# Patient Record
Sex: FEMALE | Race: WHITE | NOT HISPANIC OR LATINO
[De-identification: names, ages, dates, MRNs, and addresses within clinical notes are randomized per-mention and may not be internally consistent; named-entity substitution may affect disease eponyms.]

---

## 2017-05-24 ENCOUNTER — APPOINTMENT (OUTPATIENT)
Dept: OBGYN | Facility: CLINIC | Age: 82
End: 2017-05-24

## 2017-05-24 ENCOUNTER — RESULT CHARGE (OUTPATIENT)
Age: 82
End: 2017-05-24

## 2017-05-24 VITALS
HEIGHT: 62 IN | HEART RATE: 80 BPM | DIASTOLIC BLOOD PRESSURE: 72 MMHG | BODY MASS INDEX: 34.78 KG/M2 | SYSTOLIC BLOOD PRESSURE: 124 MMHG | WEIGHT: 189 LBS

## 2017-05-24 DIAGNOSIS — N95.0 POSTMENOPAUSAL BLEEDING: ICD-10-CM

## 2017-05-24 LAB
BILIRUB UR QL STRIP: NEGATIVE
GLUCOSE UR-MCNC: NEGATIVE
HCG UR QL: 0.2 EU/DL
HGB UR QL STRIP.AUTO: NORMAL
KETONES UR-MCNC: NEGATIVE
LEUKOCYTE ESTERASE UR QL STRIP: NORMAL
NITRITE UR QL STRIP: NEGATIVE
PH UR STRIP: 5.5
PROT UR STRIP-MCNC: NORMAL
SP GR UR STRIP: 1.02

## 2017-05-30 ENCOUNTER — MEDICATION RENEWAL (OUTPATIENT)
Age: 82
End: 2017-05-30

## 2017-05-30 LAB — BACTERIA UR CULT: ABNORMAL

## 2018-01-20 ENCOUNTER — INPATIENT (INPATIENT)
Facility: HOSPITAL | Age: 83
LOS: 4 days | Discharge: HOME | DRG: 178 | End: 2018-01-25
Attending: HOSPITALIST | Admitting: HOSPITALIST
Payer: MEDICARE

## 2018-01-20 VITALS
SYSTOLIC BLOOD PRESSURE: 134 MMHG | RESPIRATION RATE: 35 BRPM | DIASTOLIC BLOOD PRESSURE: 61 MMHG | HEIGHT: 65 IN | TEMPERATURE: 94 F | WEIGHT: 220.02 LBS | HEART RATE: 86 BPM

## 2018-01-20 DIAGNOSIS — R06.03 ACUTE RESPIRATORY DISTRESS: ICD-10-CM

## 2018-01-20 LAB
ALBUMIN SERPL ELPH-MCNC: 2.2 G/DL — LOW (ref 3.3–5.2)
ALP SERPL-CCNC: 126 U/L — HIGH (ref 40–120)
ALT FLD-CCNC: 8 U/L — SIGNIFICANT CHANGE UP
ANION GAP SERPL CALC-SCNC: 10 MMOL/L — SIGNIFICANT CHANGE UP (ref 5–17)
APPEARANCE UR: CLEAR — SIGNIFICANT CHANGE UP
APTT BLD: 30 SEC — SIGNIFICANT CHANGE UP (ref 27.5–37.4)
AST SERPL-CCNC: 36 U/L — HIGH
BACTERIA # UR AUTO: ABNORMAL
BASOPHILS # BLD AUTO: 0 K/UL — SIGNIFICANT CHANGE UP (ref 0–0.2)
BASOPHILS NFR BLD AUTO: 0.1 % — SIGNIFICANT CHANGE UP (ref 0–2)
BILIRUB SERPL-MCNC: 0.2 MG/DL — LOW (ref 0.4–2)
BILIRUB UR-MCNC: NEGATIVE — SIGNIFICANT CHANGE UP
BUN SERPL-MCNC: 24 MG/DL — HIGH (ref 8–20)
CALCIUM SERPL-MCNC: 9.4 MG/DL — SIGNIFICANT CHANGE UP (ref 8.6–10.2)
CHLORIDE SERPL-SCNC: 99 MMOL/L — SIGNIFICANT CHANGE UP (ref 98–107)
CO2 SERPL-SCNC: 26 MMOL/L — SIGNIFICANT CHANGE UP (ref 22–29)
COLOR SPEC: YELLOW — SIGNIFICANT CHANGE UP
CREAT SERPL-MCNC: 0.95 MG/DL — SIGNIFICANT CHANGE UP (ref 0.5–1.3)
DIFF PNL FLD: ABNORMAL
EOSINOPHIL # BLD AUTO: 0.1 K/UL — SIGNIFICANT CHANGE UP (ref 0–0.5)
EOSINOPHIL NFR BLD AUTO: 0.3 % — SIGNIFICANT CHANGE UP (ref 0–6)
EPI CELLS # UR: SIGNIFICANT CHANGE UP
GLUCOSE BLDC GLUCOMTR-MCNC: 136 MG/DL — HIGH (ref 70–99)
GLUCOSE BLDC GLUCOMTR-MCNC: 48 MG/DL — LOW (ref 70–99)
GLUCOSE BLDC GLUCOMTR-MCNC: 53 MG/DL — LOW (ref 70–99)
GLUCOSE SERPL-MCNC: 102 MG/DL — SIGNIFICANT CHANGE UP (ref 70–115)
GLUCOSE UR QL: NEGATIVE MG/DL — SIGNIFICANT CHANGE UP
HCT VFR BLD CALC: 30.3 % — LOW (ref 37–47)
HGB BLD-MCNC: 10 G/DL — LOW (ref 12–16)
INR BLD: 1.1 RATIO — SIGNIFICANT CHANGE UP (ref 0.88–1.16)
KETONES UR-MCNC: NEGATIVE — SIGNIFICANT CHANGE UP
LACTATE BLDV-MCNC: 0.8 MMOL/L — SIGNIFICANT CHANGE UP (ref 0.5–2)
LEUKOCYTE ESTERASE UR-ACNC: ABNORMAL
LIDOCAIN IGE QN: 14 U/L — LOW (ref 22–51)
LYMPHOCYTES # BLD AUTO: 1.1 K/UL — SIGNIFICANT CHANGE UP (ref 1–4.8)
LYMPHOCYTES # BLD AUTO: 5.3 % — LOW (ref 20–55)
MAGNESIUM SERPL-MCNC: 1.7 MG/DL — SIGNIFICANT CHANGE UP (ref 1.6–2.6)
MCHC RBC-ENTMCNC: 31.1 PG — HIGH (ref 27–31)
MCHC RBC-ENTMCNC: 33 G/DL — SIGNIFICANT CHANGE UP (ref 32–36)
MCV RBC AUTO: 94.1 FL — SIGNIFICANT CHANGE UP (ref 81–99)
MONOCYTES # BLD AUTO: 1.4 K/UL — HIGH (ref 0–0.8)
MONOCYTES NFR BLD AUTO: 6.6 % — SIGNIFICANT CHANGE UP (ref 3–10)
NEUTROPHILS # BLD AUTO: 18.1 K/UL — HIGH (ref 1.8–8)
NEUTROPHILS NFR BLD AUTO: 87.4 % — HIGH (ref 37–73)
NITRITE UR-MCNC: NEGATIVE — SIGNIFICANT CHANGE UP
NT-PROBNP SERPL-SCNC: 887 PG/ML — HIGH (ref 0–300)
PH UR: 6 — SIGNIFICANT CHANGE UP (ref 5–8)
PHOSPHATE SERPL-MCNC: 3.4 MG/DL — SIGNIFICANT CHANGE UP (ref 2.4–4.7)
PLATELET # BLD AUTO: 560 K/UL — HIGH (ref 150–400)
POTASSIUM SERPL-MCNC: 5.6 MMOL/L — HIGH (ref 3.5–5.3)
POTASSIUM SERPL-SCNC: 5.6 MMOL/L — HIGH (ref 3.5–5.3)
PROT SERPL-MCNC: 6 G/DL — LOW (ref 6.6–8.7)
PROT UR-MCNC: NEGATIVE MG/DL — SIGNIFICANT CHANGE UP
PROTHROM AB SERPL-ACNC: 12.1 SEC — SIGNIFICANT CHANGE UP (ref 9.8–12.7)
RBC # BLD: 3.22 M/UL — LOW (ref 4.4–5.2)
RBC # FLD: 13.7 % — SIGNIFICANT CHANGE UP (ref 11–15.6)
RBC CASTS # UR COMP ASSIST: ABNORMAL /HPF (ref 0–4)
SODIUM SERPL-SCNC: 135 MMOL/L — SIGNIFICANT CHANGE UP (ref 135–145)
SP GR SPEC: 1.01 — SIGNIFICANT CHANGE UP (ref 1.01–1.02)
TROPONIN T SERPL-MCNC: <0.01 NG/ML — SIGNIFICANT CHANGE UP (ref 0–0.06)
TSH SERPL-MCNC: 1.64 UIU/ML — SIGNIFICANT CHANGE UP (ref 0.27–4.2)
UROBILINOGEN FLD QL: NEGATIVE MG/DL — SIGNIFICANT CHANGE UP
WBC # BLD: 20.7 K/UL — HIGH (ref 4.8–10.8)
WBC # FLD AUTO: 20.7 K/UL — HIGH (ref 4.8–10.8)
WBC UR QL: SIGNIFICANT CHANGE UP

## 2018-01-20 PROCEDURE — 93010 ELECTROCARDIOGRAM REPORT: CPT

## 2018-01-20 PROCEDURE — 99223 1ST HOSP IP/OBS HIGH 75: CPT

## 2018-01-20 PROCEDURE — 71045 X-RAY EXAM CHEST 1 VIEW: CPT | Mod: 26

## 2018-01-20 PROCEDURE — 99285 EMERGENCY DEPT VISIT HI MDM: CPT | Mod: 25

## 2018-01-20 RX ORDER — DEXTROSE 50 % IN WATER 50 %
25 SYRINGE (ML) INTRAVENOUS ONCE
Qty: 0 | Refills: 0 | Status: DISCONTINUED | OUTPATIENT
Start: 2018-01-20 | End: 2018-01-25

## 2018-01-20 RX ORDER — DEXTROSE 50 % IN WATER 50 %
12.5 SYRINGE (ML) INTRAVENOUS ONCE
Qty: 0 | Refills: 0 | Status: DISCONTINUED | OUTPATIENT
Start: 2018-01-20 | End: 2018-01-25

## 2018-01-20 RX ORDER — INSULIN LISPRO 100/ML
VIAL (ML) SUBCUTANEOUS
Qty: 0 | Refills: 0 | Status: DISCONTINUED | OUTPATIENT
Start: 2018-01-20 | End: 2018-01-25

## 2018-01-20 RX ORDER — DOCUSATE SODIUM 100 MG
100 CAPSULE ORAL DAILY
Qty: 0 | Refills: 0 | Status: DISCONTINUED | OUTPATIENT
Start: 2018-01-20 | End: 2018-01-25

## 2018-01-20 RX ORDER — GLUCAGON INJECTION, SOLUTION 0.5 MG/.1ML
1 INJECTION, SOLUTION SUBCUTANEOUS ONCE
Qty: 0 | Refills: 0 | Status: DISCONTINUED | OUTPATIENT
Start: 2018-01-20 | End: 2018-01-25

## 2018-01-20 RX ORDER — SODIUM CHLORIDE 9 MG/ML
1000 INJECTION, SOLUTION INTRAVENOUS
Qty: 0 | Refills: 0 | Status: DISCONTINUED | OUTPATIENT
Start: 2018-01-20 | End: 2018-01-22

## 2018-01-20 RX ORDER — OXYCODONE AND ACETAMINOPHEN 5; 325 MG/1; MG/1
1 TABLET ORAL EVERY 8 HOURS
Qty: 0 | Refills: 0 | Status: DISCONTINUED | OUTPATIENT
Start: 2018-01-20 | End: 2018-01-25

## 2018-01-20 RX ORDER — BRIMONIDINE TARTRATE 2 MG/MG
1 SOLUTION/ DROPS OPHTHALMIC
Qty: 0 | Refills: 0 | Status: DISCONTINUED | OUTPATIENT
Start: 2018-01-20 | End: 2018-01-25

## 2018-01-20 RX ORDER — ALPRAZOLAM 0.25 MG
0.25 TABLET ORAL AT BEDTIME
Qty: 0 | Refills: 0 | Status: DISCONTINUED | OUTPATIENT
Start: 2018-01-20 | End: 2018-01-25

## 2018-01-20 RX ORDER — TIMOLOL 0.5 %
1 DROPS OPHTHALMIC (EYE)
Qty: 0 | Refills: 0 | Status: DISCONTINUED | OUTPATIENT
Start: 2018-01-20 | End: 2018-01-25

## 2018-01-20 RX ORDER — VANCOMYCIN HCL 1 G
1000 VIAL (EA) INTRAVENOUS ONCE
Qty: 0 | Refills: 0 | Status: COMPLETED | OUTPATIENT
Start: 2018-01-20 | End: 2018-01-20

## 2018-01-20 RX ORDER — METRONIDAZOLE 7.5 MG/G
1 GEL VAGINAL
Qty: 0 | Refills: 0 | Status: DISCONTINUED | OUTPATIENT
Start: 2018-01-20 | End: 2018-01-25

## 2018-01-20 RX ORDER — SIMVASTATIN 20 MG/1
20 TABLET, FILM COATED ORAL AT BEDTIME
Qty: 0 | Refills: 0 | Status: DISCONTINUED | OUTPATIENT
Start: 2018-01-20 | End: 2018-01-25

## 2018-01-20 RX ORDER — SACCHAROMYCES BOULARDII 250 MG
250 POWDER IN PACKET (EA) ORAL
Qty: 0 | Refills: 0 | Status: DISCONTINUED | OUTPATIENT
Start: 2018-01-20 | End: 2018-01-25

## 2018-01-20 RX ORDER — MUPIROCIN 20 MG/G
1 OINTMENT TOPICAL
Qty: 0 | Refills: 0 | Status: DISCONTINUED | OUTPATIENT
Start: 2018-01-20 | End: 2018-01-25

## 2018-01-20 RX ORDER — SODIUM CHLORIDE 9 MG/ML
1000 INJECTION, SOLUTION INTRAVENOUS
Qty: 0 | Refills: 0 | Status: DISCONTINUED | OUTPATIENT
Start: 2018-01-20 | End: 2018-01-25

## 2018-01-20 RX ORDER — PIPERACILLIN AND TAZOBACTAM 4; .5 G/20ML; G/20ML
3.38 INJECTION, POWDER, LYOPHILIZED, FOR SOLUTION INTRAVENOUS EVERY 8 HOURS
Qty: 0 | Refills: 0 | Status: DISCONTINUED | OUTPATIENT
Start: 2018-01-20 | End: 2018-01-21

## 2018-01-20 RX ORDER — DEXTROSE 50 % IN WATER 50 %
1 SYRINGE (ML) INTRAVENOUS ONCE
Qty: 0 | Refills: 0 | Status: DISCONTINUED | OUTPATIENT
Start: 2018-01-20 | End: 2018-01-25

## 2018-01-20 RX ORDER — PIPERACILLIN AND TAZOBACTAM 4; .5 G/20ML; G/20ML
3.38 INJECTION, POWDER, LYOPHILIZED, FOR SOLUTION INTRAVENOUS ONCE
Qty: 0 | Refills: 0 | Status: COMPLETED | OUTPATIENT
Start: 2018-01-20 | End: 2018-01-20

## 2018-01-20 RX ADMIN — SODIUM CHLORIDE 50 MILLILITER(S): 9 INJECTION, SOLUTION INTRAVENOUS at 14:39

## 2018-01-20 RX ADMIN — OXYCODONE AND ACETAMINOPHEN 1 TABLET(S): 5; 325 TABLET ORAL at 14:50

## 2018-01-20 RX ADMIN — OXYCODONE AND ACETAMINOPHEN 1 TABLET(S): 5; 325 TABLET ORAL at 21:36

## 2018-01-20 RX ADMIN — OXYCODONE AND ACETAMINOPHEN 1 TABLET(S): 5; 325 TABLET ORAL at 22:30

## 2018-01-20 RX ADMIN — PIPERACILLIN AND TAZOBACTAM 200 GRAM(S): 4; .5 INJECTION, POWDER, LYOPHILIZED, FOR SOLUTION INTRAVENOUS at 08:43

## 2018-01-20 RX ADMIN — SIMVASTATIN 20 MILLIGRAM(S): 20 TABLET, FILM COATED ORAL at 21:36

## 2018-01-20 RX ADMIN — Medication 250 MILLIGRAM(S): at 09:35

## 2018-01-20 RX ADMIN — PIPERACILLIN AND TAZOBACTAM 25 GRAM(S): 4; .5 INJECTION, POWDER, LYOPHILIZED, FOR SOLUTION INTRAVENOUS at 19:29

## 2018-01-20 RX ADMIN — OXYCODONE AND ACETAMINOPHEN 1 TABLET(S): 5; 325 TABLET ORAL at 15:02

## 2018-01-20 RX ADMIN — Medication 0.25 MILLIGRAM(S): at 21:36

## 2018-01-20 NOTE — ED ADULT NURSE NOTE - OBJECTIVE STATEMENT
84 yof presents to ed complaining of difficulty breathing started this AM, onset of symptoms at 6 am. increase work of breathing reported by NX home. at this time patient afebrile, hypothermic, warming blanket applied. code sepsis protocol followed. airway intact lung sounds clear equal bilaterally ab round denies pain/ discomfort. denies numbness denies tingling denies cp, moves all extremities with assistance. stage II pressure ulcer right buttock. Malodorous urine straight cath'd

## 2018-01-20 NOTE — H&P ADULT - ASSESSMENT
Pt with multiple problems sent from NH for respiratory distress- has been admitted to medicine for possible PNA.    C/w Abx.  Pt DNI /DNR- MOLST form from NH in charts.    Full note to follow. Pt is very poor historian and unable to provide meaningful history. History obtained from ED staff, EMR and NH charts. Briefly she is 83 y/o female with PMHx of HTN, HLD, DM, Neuropathy, Arthritis,  Anxiety disorder, pressure ulcers, glaucoma, was sent to ED from NH for respiratory distress. Per history pt was noted to have tachypnea, laboured breathing at NH and noted to have hypoglycemia 51 by EMS and glucagon was given en-route to ED. Upon arrival to ED she was very tachypnea and patient was placed on CPAP. During my evaluation patient on NC oxygen, NAD, reports she is fine and at baseline, she denied any breathing problems but at the same time is AOX2- poor historian. IN Ed noted to have leukocytosis, hypoglycemia, hypothermia kept on warming blanket, high Pro-BNP, UA /CXR unremarkable. Admitted to medicine for respiratory distress and hypoglycemia.     Plan:    Acute hypoxic Respiratory distress:  - Unknown etiology, CXR unremarkable, Pro-BNP elevated with LE edema, ? Cardiac origin.  - Supplement oxygen vi NC, BIPAP as needed  - Obtain TTE, CT chest   - S/p Vanco /zosyn in ED. Will c/w Zosyn for now pending cx.    Hypoglycemia / Hypothermia:  - hypoglycemia can be related to poor PO intake and also pt on antidiabetic meds. Hypothermia can be from hypoglycemia.   - Keep on D5 for now  - Check TSH    Chronic condition HTN /HLD /DM / neuropathy / arhtirits / Glaucoma /anxiety / Pressure ulcer:  - Resume home meds except oral hypoglycemic / antihypertensive drugs for now.  - Keep on LSS with Fs monitoring for DM. Currently hypoglycemic- will keep on D5 for now.  - Nursing care for sacral ulcers.    Code status:  - DNR /DNI- MOLST form from NH in chart.

## 2018-01-20 NOTE — ED ADULT TRIAGE NOTE - CHIEF COMPLAINT QUOTE
Pt comes from [facility] with respiratory distress "foaming at mouth, with rales all the way up" as per EMS, pt tachypneic with labored breathing, RT and MD Berry called to bedside, pt placed on cpap prior to arrival

## 2018-01-20 NOTE — ED PROVIDER NOTE - CARE PLAN
Principal Discharge DX:	Respiratory distress Principal Discharge DX:	Respiratory distress  Secondary Diagnosis:	Pneumonia

## 2018-01-20 NOTE — ED ADULT NURSE NOTE - CHIEF COMPLAINT QUOTE
Pt comes from [facility] with respiratory distress "foaming at mouth, with rales all the way up" as per EMS, pt tachypneic with labored breathing, RT and MD Berry called to bedside, pt placed on cpap prior to arrival

## 2018-01-20 NOTE — H&P ADULT - HISTORY OF PRESENT ILLNESS
Pt is very poor historian and unable to provide meaningful history. History obtained from ED staff, EMR and NH charts. Briefly she is 83 y/o female with PMHx of HTN, HLD, DM, Neuropathy, Arthritis,  Anxiety disorder, pressure ulcers, glaucoma, was sent to ED from NH for respiratory distress. Per history pt was noted to have tachypnea, laboured breathing at NH and noted to have hypoglycemia 51 by EMS and glucagon was given en-route to ED. Upon arrival to ED she was very tachypnea and patient was placed on CPAP. During my evaluation patient on NC oxygen, NAD, reports she is fine and at baseline, she denied any breathing problems but at the same time is AOX2- poor historian.

## 2018-01-20 NOTE — H&P ADULT - NSHPLABSRESULTS_GEN_ALL_CORE
LABS:                        10.0   20.7  )-----------( 560      ( 2018 07:38 )             30.3         135  |  99  |  24.0<H>  ----------------------------<  102  5.6<H>   |  26.0  |  0.95    Ca    9.4      2018 07:38  Phos  3.4       Mg     1.7         TPro  6.0<L>  /  Alb  2.2<L>  /  TBili  0.2<L>  /  DBili  x   /  AST  36<H>  /  ALT  8   /  AlkPhos  126<H>      PT/INR - ( 2018 07:38 )   PT: 12.1 sec;   INR: 1.10 ratio         PTT - ( 2018 07:38 )  PTT:30.0 sec    LIVER FUNCTIONS - ( 2018 07:38 )  Alb: 2.2 g/dL / Pro: 6.0 g/dL / ALK PHOS: 126 U/L / ALT: 8 U/L / AST: 36 U/L / GGT: x           Urinalysis Basic - ( 2018 08:40 )    Color: Yellow / Appearance: Clear / S.010 / pH: x  Gluc: x / Ketone: Negative  / Bili: Negative / Urobili: Negative mg/dL   Blood: x / Protein: Negative mg/dL / Nitrite: Negative   Leuk Esterase: Trace / RBC: x / WBC x   Sq Epi: x / Non Sq Epi: x / Bacteria: x        CARDIAC MARKERS ( 2018 07:38 )  x     / <0.01 ng/mL / x     / x     / x

## 2018-01-20 NOTE — ED PROVIDER NOTE - PROGRESS NOTE DETAILS
labs pending  signed to am ed attending pt with pneumonia to be admitted to [facility]

## 2018-01-20 NOTE — H&P ADULT - NSHPPHYSICALEXAM_GEN_ALL_CORE
PHYSICAL EXAM:    Vital Signs Last 24 Hrs  T(C): 34.7 (20 Jan 2018 06:45), Max: 34.7 (20 Jan 2018 06:45)  T(F): 94.5 (20 Jan 2018 06:45), Max: 94.5 (20 Jan 2018 06:45)  HR: 86 (20 Jan 2018 06:45) (86 - 86)  BP: 134/61 (20 Jan 2018 06:45) (134/61 - 134/61)  BP(mean): --  RR: 35 (20 Jan 2018 06:45) (35 - 35)  SpO2: --    GENERAL: Pt lying comfortably, NAD.  ENMT: PERRL, +EOMI.  NECK: soft, Supple, No JVD,   CHEST/LUNG: Clear to auscultation bilaterally; No wheezing.  HEART: S1S2+, Regular rate and rhythm; No murmurs.  ABDOMEN: Soft, Nontender, Obese, Bowel sounds present.  MUSCULOSKELETAL: Normal range of motion.  SKIN: Sacral ulcer ? stage / right buttock redness  NEURO: AAOX2, moves all 4 extremities.   PSYCH: normal mood.

## 2018-01-20 NOTE — H&P ADULT - PMH
Arthritis    Chronic back pain, unspecified back location, unspecified back pain laterality    Diabetes 1.5, managed as type 2    Essential hypertension    Glaucoma, unspecified glaucoma type, unspecified laterality    Pure hypercholesterolemia    Skin ulcer, unspecified ulcer stage

## 2018-01-20 NOTE — ED PROVIDER NOTE - PHYSICAL EXAMINATION
Constitutional : In moderate respiratory distress. Speaking in 1-2 word sentences, incomprehensible.   Head :NC AT , no swelling  Eyes :eomi, no swelling  Mouth :Dry mm.   Neck : supple, trachea in midline  Chest :Bilateral poor inspiratory effort.  Heart :S1 S2 distant  Abdomen: ABDOMEN IS OBESE, DISTENDED.   Musc/Skel :BILATERAL LOWER EXTREMITY 2+ PITTING EDEMA. GENERALIZED WEAKNESS, UNABLE TO ASSESS RANGE OF MOTION.  Neuro  :Difficult to assess, pt is too sick.  Skin: pale, clammy.

## 2018-01-20 NOTE — ED PROVIDER NOTE - OBJECTIVE STATEMENT
85 y/o F pt with a pmhx of DM, peripheral neuropathy, hypercholesterolemia, HTN, glaucoma and arthropathy BIBA c/o difficulty breathing. [facility]. The nursing home activated EMS for respiratory distress. EMS notes that the blood gluces was 51 enroute. EMS administered glucagon en route. Unable to obtain a full Hx, pt is too sick.

## 2018-01-21 LAB
-  COAGULASE NEGATIVE STAPHYLOCOCCUS: SIGNIFICANT CHANGE UP
ALBUMIN SERPL ELPH-MCNC: 1.8 G/DL — LOW (ref 3.3–5.2)
ALP SERPL-CCNC: 116 U/L — SIGNIFICANT CHANGE UP (ref 40–120)
ALT FLD-CCNC: 7 U/L — SIGNIFICANT CHANGE UP
ANION GAP SERPL CALC-SCNC: 11 MMOL/L — SIGNIFICANT CHANGE UP (ref 5–17)
AST SERPL-CCNC: 28 U/L — SIGNIFICANT CHANGE UP
BILIRUB SERPL-MCNC: 0.2 MG/DL — LOW (ref 0.4–2)
BUN SERPL-MCNC: 21 MG/DL — HIGH (ref 8–20)
CALCIUM SERPL-MCNC: 9.3 MG/DL — SIGNIFICANT CHANGE UP (ref 8.6–10.2)
CHLORIDE SERPL-SCNC: 95 MMOL/L — LOW (ref 98–107)
CO2 SERPL-SCNC: 24 MMOL/L — SIGNIFICANT CHANGE UP (ref 22–29)
CREAT SERPL-MCNC: 0.93 MG/DL — SIGNIFICANT CHANGE UP (ref 0.5–1.3)
GLUCOSE BLDC GLUCOMTR-MCNC: 197 MG/DL — HIGH (ref 70–99)
GLUCOSE BLDC GLUCOMTR-MCNC: 214 MG/DL — HIGH (ref 70–99)
GLUCOSE BLDC GLUCOMTR-MCNC: 54 MG/DL — LOW (ref 70–99)
GLUCOSE BLDC GLUCOMTR-MCNC: 67 MG/DL — LOW (ref 70–99)
GLUCOSE BLDC GLUCOMTR-MCNC: 67 MG/DL — LOW (ref 70–99)
GLUCOSE BLDC GLUCOMTR-MCNC: 83 MG/DL — SIGNIFICANT CHANGE UP (ref 70–99)
GLUCOSE SERPL-MCNC: 61 MG/DL — LOW (ref 70–115)
HBA1C BLD-MCNC: 7.2 % — HIGH (ref 4–5.6)
HCT VFR BLD CALC: 28.3 % — LOW (ref 37–47)
HGB BLD-MCNC: 9.2 G/DL — LOW (ref 12–16)
MCHC RBC-ENTMCNC: 30.2 PG — SIGNIFICANT CHANGE UP (ref 27–31)
MCHC RBC-ENTMCNC: 32.5 G/DL — SIGNIFICANT CHANGE UP (ref 32–36)
MCV RBC AUTO: 92.8 FL — SIGNIFICANT CHANGE UP (ref 81–99)
METHOD TYPE: SIGNIFICANT CHANGE UP
ORGANISM # SPEC MICROSCOPIC CNT: SIGNIFICANT CHANGE UP
PLATELET # BLD AUTO: 494 K/UL — HIGH (ref 150–400)
POTASSIUM SERPL-MCNC: 5.2 MMOL/L — SIGNIFICANT CHANGE UP (ref 3.5–5.3)
POTASSIUM SERPL-SCNC: 5.2 MMOL/L — SIGNIFICANT CHANGE UP (ref 3.5–5.3)
PROT SERPL-MCNC: 5.3 G/DL — LOW (ref 6.6–8.7)
RAPID RVP RESULT: SIGNIFICANT CHANGE UP
RBC # BLD: 3.05 M/UL — LOW (ref 4.4–5.2)
RBC # FLD: 13.9 % — SIGNIFICANT CHANGE UP (ref 11–15.6)
SODIUM SERPL-SCNC: 130 MMOL/L — LOW (ref 135–145)
SPECIMEN SOURCE: SIGNIFICANT CHANGE UP
WBC # BLD: 8.5 K/UL — SIGNIFICANT CHANGE UP (ref 4.8–10.8)
WBC # FLD AUTO: 8.5 K/UL — SIGNIFICANT CHANGE UP (ref 4.8–10.8)

## 2018-01-21 PROCEDURE — 99233 SBSQ HOSP IP/OBS HIGH 50: CPT

## 2018-01-21 PROCEDURE — 71275 CT ANGIOGRAPHY CHEST: CPT | Mod: 26

## 2018-01-21 RX ORDER — CEFEPIME 1 G/1
2000 INJECTION, POWDER, FOR SOLUTION INTRAMUSCULAR; INTRAVENOUS
Qty: 0 | Refills: 0 | Status: DISCONTINUED | OUTPATIENT
Start: 2018-01-21 | End: 2018-01-25

## 2018-01-21 RX ORDER — DEXTROSE 50 % IN WATER 50 %
1 SYRINGE (ML) INTRAVENOUS ONCE
Qty: 0 | Refills: 0 | Status: COMPLETED | OUTPATIENT
Start: 2018-01-21 | End: 2018-01-21

## 2018-01-21 RX ADMIN — OXYCODONE AND ACETAMINOPHEN 1 TABLET(S): 5; 325 TABLET ORAL at 22:15

## 2018-01-21 RX ADMIN — OXYCODONE AND ACETAMINOPHEN 1 TABLET(S): 5; 325 TABLET ORAL at 14:35

## 2018-01-21 RX ADMIN — OXYCODONE AND ACETAMINOPHEN 1 TABLET(S): 5; 325 TABLET ORAL at 07:54

## 2018-01-21 RX ADMIN — Medication 1 DROP(S): at 06:45

## 2018-01-21 RX ADMIN — OXYCODONE AND ACETAMINOPHEN 1 TABLET(S): 5; 325 TABLET ORAL at 08:45

## 2018-01-21 RX ADMIN — Medication 250 MILLIGRAM(S): at 06:44

## 2018-01-21 RX ADMIN — CEFEPIME 100 MILLIGRAM(S): 1 INJECTION, POWDER, FOR SOLUTION INTRAMUSCULAR; INTRAVENOUS at 18:06

## 2018-01-21 RX ADMIN — Medication 1 TABLET(S): at 12:18

## 2018-01-21 RX ADMIN — OXYCODONE AND ACETAMINOPHEN 1 TABLET(S): 5; 325 TABLET ORAL at 21:59

## 2018-01-21 RX ADMIN — MUPIROCIN 1 APPLICATION(S): 20 OINTMENT TOPICAL at 06:45

## 2018-01-21 RX ADMIN — BRIMONIDINE TARTRATE 1 DROP(S): 2 SOLUTION/ DROPS OPHTHALMIC at 06:45

## 2018-01-21 RX ADMIN — Medication 250 MILLIGRAM(S): at 18:07

## 2018-01-21 RX ADMIN — Medication 2: at 12:18

## 2018-01-21 RX ADMIN — MUPIROCIN 1 APPLICATION(S): 20 OINTMENT TOPICAL at 18:07

## 2018-01-21 RX ADMIN — Medication 100 MILLIGRAM(S): at 12:18

## 2018-01-21 RX ADMIN — BRIMONIDINE TARTRATE 1 DROP(S): 2 SOLUTION/ DROPS OPHTHALMIC at 18:06

## 2018-01-21 RX ADMIN — Medication 1 DOSE(S): at 08:05

## 2018-01-21 RX ADMIN — Medication 2: at 17:04

## 2018-01-21 RX ADMIN — Medication 0.25 MILLIGRAM(S): at 21:59

## 2018-01-21 RX ADMIN — METRONIDAZOLE 1 APPLICATION(S): 7.5 GEL VAGINAL at 06:44

## 2018-01-21 RX ADMIN — PIPERACILLIN AND TAZOBACTAM 25 GRAM(S): 4; .5 INJECTION, POWDER, LYOPHILIZED, FOR SOLUTION INTRAVENOUS at 06:45

## 2018-01-21 RX ADMIN — OXYCODONE AND ACETAMINOPHEN 1 TABLET(S): 5; 325 TABLET ORAL at 15:30

## 2018-01-21 RX ADMIN — METRONIDAZOLE 1 APPLICATION(S): 7.5 GEL VAGINAL at 18:06

## 2018-01-21 RX ADMIN — SIMVASTATIN 20 MILLIGRAM(S): 20 TABLET, FILM COATED ORAL at 21:59

## 2018-01-21 RX ADMIN — Medication 1 DROP(S): at 18:08

## 2018-01-21 NOTE — PATIENT PROFILE ADULT. - VISION (WITH CORRECTIVE LENSES IF THE PATIENT USUALLY WEARS THEM):
Normal vision: sees adequately in most situations; can see medication labels, newsprint/does wear glasses

## 2018-01-21 NOTE — PROGRESS NOTE ADULT - SUBJECTIVE AND OBJECTIVE BOX
DAVION QUACH Female 84y [id number]    Patient is a 84y old  Female who presents with a chief complaint of Sent from NH for respiratory distress. (2018 10:22)      Subjective/objective:  Pt seen and examined at bedside, no over night event reported by night staff. Pt has ? mild underlying dementia- she is awake, knows her , her sons name and knows she is in hospital. Does not has idea why she was brought into hospital. She denied any complaints.     Review of system:  No fever, chills, nausea, vomiting, headache, dizziness, chest pain, SOB or palpitation.      PHYSICAL EXAM:    Vital Signs Last 24 Hrs  T(C): 36.6 (2018 10:36), Max: 36.9 (2018 20:00)  T(F): 97.9 (2018 10:36), Max: 98.4 (2018 20:00)  HR: 84 (2018 10:36) (70 - 89)  BP: 134/66 (2018 10:36) (117/58 - 177/69)  BP(mean): --  RR: 20 (2018 10:36) (20 - 22)  SpO2: 100% (2018 07:37) (92% - 100%)    GENERAL: Pt lying comfortably, NAD.  CHEST/LUNG: Clear to auscultation bilaterally; No wheezing.  HEART: S1S2+, Regular rate and rhythm; No murmurs.  ABDOMEN: Soft, Nontender, Nondistended; Bowel sounds present.  NEURO: AAOX2, no focal deficits, no motor r sensory loss.  Skin: Sacral ulcer ? stage / right buttock redness          MEDICATIONS  (STANDING):  ALPRAZolam 0.25 milliGRAM(s) Oral at bedtime  brimonidine 0.2% Ophthalmic Solution 1 Drop(s) Both EYES two times a day  cefepime  IVPB 2000 milliGRAM(s) IV Intermittent <User Schedule>  dextrose 5%. 1000 milliLiter(s) (50 mL/Hr) IV Continuous <Continuous>  dextrose 5%. 1000 milliLiter(s) (50 mL/Hr) IV Continuous <Continuous>  dextrose 50% Injectable 12.5 Gram(s) IV Push once  dextrose 50% Injectable 25 Gram(s) IV Push once  dextrose 50% Injectable 25 Gram(s) IV Push once  docusate sodium 100 milliGRAM(s) Oral daily  insulin lispro (HumaLOG) corrective regimen sliding scale   SubCutaneous three times a day before meals  metroNIDAZOLE 0.75% Gel 1 Application(s) Topical two times a day  multivitamin 1 Tablet(s) Oral daily  mupirocin 2% Ointment 1 Application(s) Topical two times a day  oxyCODONE    5 mG/acetaminophen 325 mG 1 Tablet(s) Oral every 8 hours  saccharomyces boulardii 250 milliGRAM(s) Oral two times a day  simvastatin 20 milliGRAM(s) Oral at bedtime  timolol 0.5% Solution 1 Drop(s) Both EYES two times a day    MEDICATIONS  (PRN):  dextrose Gel 1 Dose(s) Oral once PRN Blood Glucose LESS THAN 70 milliGRAM(s)/deciliter  glucagon  Injectable 1 milliGRAM(s) IntraMuscular once PRN Glucose LESS THAN 70 milligrams/deciliter        Labs:  LABS:                        9.2    8.5   )-----------( 494      ( 2018 06:29 )             28.3     01-    130<L>  |  95<L>  |  21.0<H>  ----------------------------<  61<L>  5.2   |  24.0  |  0.93    Ca    9.3      2018 06:29  Phos  3.4     01-20  Mg     1.7     -    TPro  5.3<L>  /  Alb  1.8<L>  /  TBili  0.2<L>  /  DBili  x   /  AST  28  /  ALT  7   /  AlkPhos  116  -    PT/INR - ( 2018 07:38 )   PT: 12.1 sec;   INR: 1.10 ratio         PTT - ( 2018 07:38 )  PTT:30.0 sec    LIVER FUNCTIONS - ( 2018 06:29 )  Alb: 1.8 g/dL / Pro: 5.3 g/dL / ALK PHOS: 116 U/L / ALT: 7 U/L / AST: 28 U/L / GGT: x           Urinalysis Basic - ( 2018 08:40 )    Color: Yellow / Appearance: Clear / S.010 / pH: x  Gluc: x / Ketone: Negative  / Bili: Negative / Urobili: Negative mg/dL   Blood: x / Protein: Negative mg/dL / Nitrite: Negative   Leuk Esterase: Trace / RBC: 3-5 /HPF / WBC 0-2   Sq Epi: x / Non Sq Epi: Few / Bacteria: Few        CARDIAC MARKERS ( 2018 07:38 )  x     / <0.01 ng/mL / x     / x     / x

## 2018-01-21 NOTE — PROGRESS NOTE ADULT - ASSESSMENT
Pt is very poor historian and unable to provide meaningful history. History obtained from ED staff, EMR and NH charts. Briefly she is 85 y/o female with PMHx of HTN, HLD, DM, Neuropathy, Arthritis,  Anxiety disorder, pressure sacral ulcers, glaucoma, was sent to ED from NH for respiratory distress. Per history pt was noted to have tachypnea, laboured breathing at NH and noted to have hypoglycemia 51 by EMS and glucagon was given en-route to ED. Upon arrival to ED she was very tachypnea and patient was placed on CPAP. During my evaluation patient on NC oxygen, NAD. IN ED noted to have leukocytosis, hypoglycemia, hypothermia kept on warming blanket, high Pro-BNP, UA /CXR unremarkable. Admitted to medicine for respiratory distress and hypoglycemia. Started on empiric abx for suspected PNA. CT chest shows b/l effusion, mucous plugging and possible superimposed PNA.      Plan:    Acute hypoxic Respiratory distress likely from PNA with mucus plug:  - CT chest shows b/l effusion, mucous plugging and possible superimposed PNA. Normal Ef on echo.   - Supplement oxygen via NC, BIPAP as needed  - Will c/w IV abx for PNA with florastor.  - F/u blood cx.    Hypoglycemia / Hypothermia:  - hypoglycemia can be related to poor PO intake and also pt on antidiabetic meds. Hypothermia can be from hypoglycemia.   - Hypothermia resolved.   - Will c/w D5 for now  - TSH normal, A1C 7.2 tight control given her age.    Chronic condition HTN /HLD /DM / neuropathy / arhtirits / Glaucoma /anxiety / Pressure ulcer:  - Resume home meds except oral hypoglycemic / antihypertensive drugs for now.  - Keep on LSS with Fs monitoring for DM. Currently hypoglycemic- will keep on D5 for now.  - Nursing care for sacral ulcers.    Code status:  - DNR /DNI- MOLST form from NH in chart. Pt is very poor historian and unable to provide meaningful history. History obtained from ED staff, EMR and NH charts. Briefly she is 85 y/o female with PMHx of HTN, HLD, DM, Neuropathy, Arthritis,  Anxiety disorder, pressure sacral ulcers, glaucoma, was sent to ED from NH for respiratory distress. Per history pt was noted to have tachypnea, laboured breathing at NH and noted to have hypoglycemia 51 by EMS and glucagon was given en-route to ED. Upon arrival to ED she was very tachypnea and patient was placed on CPAP. During my evaluation patient on NC oxygen, NAD. IN ED noted to have leukocytosis, hypoglycemia, hypothermia kept on warming blanket, high Pro-BNP, UA /CXR unremarkable. Admitted to medicine for respiratory distress and hypoglycemia. Started on empiric abx for suspected PNA. CT chest shows b/l effusion, mucous plugging and possible superimposed PNA.      Plan:    Acute hypoxic Respiratory distress likely from PNA with mucus plug:  - CT chest shows b/l effusion, mucous plugging and possible superimposed PNA. Normal Ef on echo.   - Supplement oxygen via NC, BIPAP as needed  - Will c/w IV abx for PNA with florastor.  - F/u blood cx.    Hypoglycemia / Hypothermia:  - hypoglycemia can be related to poor PO intake and also pt on antidiabetic meds. Hypothermia can be from hypoglycemia.   - Hypothermia resolved.   - Will c/w D5 for now  - TSH normal, A1C 7.2 tight control given her age.    Acute hyponatremia:  - C/w IVF and monitor BMP for now. If persistent will do hyponatremia w/u.    Chronic condition HTN /HLD /DM / neuropathy / arhtirits / Glaucoma /anxiety / Pressure ulcer:  - Resume home meds except oral hypoglycemic / antihypertensive drugs for now.  - Keep on LSS with Fs monitoring for DM. Currently hypoglycemic- will keep on D5 for now.  - Nursing care for sacral ulcers.    Code status:  - DNR /DNI- MOLST form from NH in chart.

## 2018-01-21 NOTE — PATIENT PROFILE ADULT. - PURPOSEFUL PROACTIVE ROUNDING
Patient/pt pt received Alert and confused. the patient had their safety maintained. they where reoriented as needed. they remained hemodynamically stable. the remained free from respiratory distress. the patient had no complaints of pain or distress. medications tolerated. pt able to sleep throughout the night. assistance provided as needed. will continue to [facility]. hourly roundings done. x2.

## 2018-01-22 DIAGNOSIS — F41.9 ANXIETY DISORDER, UNSPECIFIED: ICD-10-CM

## 2018-01-22 DIAGNOSIS — F09 UNSPECIFIED MENTAL DISORDER DUE TO KNOWN PHYSIOLOGICAL CONDITION: ICD-10-CM

## 2018-01-22 DIAGNOSIS — F05 DELIRIUM DUE TO KNOWN PHYSIOLOGICAL CONDITION: ICD-10-CM

## 2018-01-22 LAB
-  AMIKACIN: SIGNIFICANT CHANGE UP
-  AMPICILLIN/SULBACTAM: SIGNIFICANT CHANGE UP
-  AMPICILLIN: SIGNIFICANT CHANGE UP
-  AZTREONAM: SIGNIFICANT CHANGE UP
-  CEFAZOLIN: SIGNIFICANT CHANGE UP
-  CEFEPIME: SIGNIFICANT CHANGE UP
-  CEFOXITIN: SIGNIFICANT CHANGE UP
-  CEFTAZIDIME: SIGNIFICANT CHANGE UP
-  CEFTRIAXONE: SIGNIFICANT CHANGE UP
-  CIPROFLOXACIN: SIGNIFICANT CHANGE UP
-  CLINDAMYCIN: SIGNIFICANT CHANGE UP
-  CLINDAMYCIN: SIGNIFICANT CHANGE UP
-  ERTAPENEM: SIGNIFICANT CHANGE UP
-  ERYTHROMYCIN: SIGNIFICANT CHANGE UP
-  ERYTHROMYCIN: SIGNIFICANT CHANGE UP
-  GENTAMICIN: SIGNIFICANT CHANGE UP
-  IMIPENEM: SIGNIFICANT CHANGE UP
-  LEVOFLOXACIN: SIGNIFICANT CHANGE UP
-  MEROPENEM: SIGNIFICANT CHANGE UP
-  MOXIFLOXACIN(AEROBIC): SIGNIFICANT CHANGE UP
-  MOXIFLOXACIN(AEROBIC): SIGNIFICANT CHANGE UP
-  NITROFURANTOIN: SIGNIFICANT CHANGE UP
-  OXACILLIN: SIGNIFICANT CHANGE UP
-  OXACILLIN: SIGNIFICANT CHANGE UP
-  PENICILLIN: SIGNIFICANT CHANGE UP
-  PENICILLIN: SIGNIFICANT CHANGE UP
-  PIPERACILLIN/TAZOBACTAM: SIGNIFICANT CHANGE UP
-  RIFAMPIN: SIGNIFICANT CHANGE UP
-  RIFAMPIN: SIGNIFICANT CHANGE UP
-  TETRACYCLINE: SIGNIFICANT CHANGE UP
-  TETRACYCLINE: SIGNIFICANT CHANGE UP
-  TOBRAMYCIN: SIGNIFICANT CHANGE UP
-  TRIMETHOPRIM/SULFAMETHOXAZOLE: SIGNIFICANT CHANGE UP
-  VANCOMYCIN: SIGNIFICANT CHANGE UP
-  VANCOMYCIN: SIGNIFICANT CHANGE UP
ANION GAP SERPL CALC-SCNC: 11 MMOL/L — SIGNIFICANT CHANGE UP (ref 5–17)
BUN SERPL-MCNC: 18 MG/DL — SIGNIFICANT CHANGE UP (ref 8–20)
CALCIUM SERPL-MCNC: 9.2 MG/DL — SIGNIFICANT CHANGE UP (ref 8.6–10.2)
CHLORIDE SERPL-SCNC: 96 MMOL/L — LOW (ref 98–107)
CO2 SERPL-SCNC: 25 MMOL/L — SIGNIFICANT CHANGE UP (ref 22–29)
CREAT SERPL-MCNC: 0.91 MG/DL — SIGNIFICANT CHANGE UP (ref 0.5–1.3)
CULTURE RESULTS: SIGNIFICANT CHANGE UP
CULTURE RESULTS: SIGNIFICANT CHANGE UP
GLUCOSE BLDC GLUCOMTR-MCNC: 196 MG/DL — HIGH (ref 70–99)
GLUCOSE BLDC GLUCOMTR-MCNC: 205 MG/DL — HIGH (ref 70–99)
GLUCOSE BLDC GLUCOMTR-MCNC: 292 MG/DL — HIGH (ref 70–99)
GLUCOSE SERPL-MCNC: 184 MG/DL — HIGH (ref 70–115)
HCT VFR BLD CALC: 29.1 % — LOW (ref 37–47)
HGB BLD-MCNC: 9.6 G/DL — LOW (ref 12–16)
MCHC RBC-ENTMCNC: 30.4 PG — SIGNIFICANT CHANGE UP (ref 27–31)
MCHC RBC-ENTMCNC: 33 G/DL — SIGNIFICANT CHANGE UP (ref 32–36)
MCV RBC AUTO: 92.1 FL — SIGNIFICANT CHANGE UP (ref 81–99)
METHOD TYPE: SIGNIFICANT CHANGE UP
ORGANISM # SPEC MICROSCOPIC CNT: SIGNIFICANT CHANGE UP
PLATELET # BLD AUTO: 560 K/UL — HIGH (ref 150–400)
POTASSIUM SERPL-MCNC: 4.9 MMOL/L — SIGNIFICANT CHANGE UP (ref 3.5–5.3)
POTASSIUM SERPL-SCNC: 4.9 MMOL/L — SIGNIFICANT CHANGE UP (ref 3.5–5.3)
RBC # BLD: 3.16 M/UL — LOW (ref 4.4–5.2)
RBC # FLD: 13.3 % — SIGNIFICANT CHANGE UP (ref 11–15.6)
SODIUM SERPL-SCNC: 132 MMOL/L — LOW (ref 135–145)
SPECIMEN SOURCE: SIGNIFICANT CHANGE UP
SPECIMEN SOURCE: SIGNIFICANT CHANGE UP
WBC # BLD: 8.3 K/UL — SIGNIFICANT CHANGE UP (ref 4.8–10.8)
WBC # FLD AUTO: 8.3 K/UL — SIGNIFICANT CHANGE UP (ref 4.8–10.8)

## 2018-01-22 PROCEDURE — 99222 1ST HOSP IP/OBS MODERATE 55: CPT

## 2018-01-22 PROCEDURE — 93010 ELECTROCARDIOGRAM REPORT: CPT

## 2018-01-22 PROCEDURE — 99233 SBSQ HOSP IP/OBS HIGH 50: CPT

## 2018-01-22 RX ORDER — SODIUM CHLORIDE 9 MG/ML
1000 INJECTION INTRAMUSCULAR; INTRAVENOUS; SUBCUTANEOUS
Qty: 0 | Refills: 0 | Status: DISCONTINUED | OUTPATIENT
Start: 2018-01-22 | End: 2018-01-23

## 2018-01-22 RX ORDER — HYDROXYZINE HCL 10 MG
10 TABLET ORAL EVERY 8 HOURS
Qty: 0 | Refills: 0 | Status: DISCONTINUED | OUTPATIENT
Start: 2018-01-22 | End: 2018-01-25

## 2018-01-22 RX ADMIN — MUPIROCIN 1 APPLICATION(S): 20 OINTMENT TOPICAL at 17:36

## 2018-01-22 RX ADMIN — BRIMONIDINE TARTRATE 1 DROP(S): 2 SOLUTION/ DROPS OPHTHALMIC at 06:16

## 2018-01-22 RX ADMIN — BRIMONIDINE TARTRATE 1 DROP(S): 2 SOLUTION/ DROPS OPHTHALMIC at 17:35

## 2018-01-22 RX ADMIN — OXYCODONE AND ACETAMINOPHEN 1 TABLET(S): 5; 325 TABLET ORAL at 14:15

## 2018-01-22 RX ADMIN — Medication 1 DROP(S): at 06:16

## 2018-01-22 RX ADMIN — METRONIDAZOLE 1 APPLICATION(S): 7.5 GEL VAGINAL at 06:16

## 2018-01-22 RX ADMIN — Medication 4: at 17:34

## 2018-01-22 RX ADMIN — OXYCODONE AND ACETAMINOPHEN 1 TABLET(S): 5; 325 TABLET ORAL at 06:40

## 2018-01-22 RX ADMIN — Medication 2: at 08:16

## 2018-01-22 RX ADMIN — Medication 1 DROP(S): at 17:35

## 2018-01-22 RX ADMIN — OXYCODONE AND ACETAMINOPHEN 1 TABLET(S): 5; 325 TABLET ORAL at 13:22

## 2018-01-22 RX ADMIN — METRONIDAZOLE 1 APPLICATION(S): 7.5 GEL VAGINAL at 17:35

## 2018-01-22 RX ADMIN — Medication 250 MILLIGRAM(S): at 17:36

## 2018-01-22 RX ADMIN — SODIUM CHLORIDE 60 MILLILITER(S): 9 INJECTION INTRAMUSCULAR; INTRAVENOUS; SUBCUTANEOUS at 17:36

## 2018-01-22 RX ADMIN — MUPIROCIN 1 APPLICATION(S): 20 OINTMENT TOPICAL at 06:16

## 2018-01-22 RX ADMIN — OXYCODONE AND ACETAMINOPHEN 1 TABLET(S): 5; 325 TABLET ORAL at 06:19

## 2018-01-22 RX ADMIN — Medication 100 MILLIGRAM(S): at 12:19

## 2018-01-22 RX ADMIN — Medication 250 MILLIGRAM(S): at 06:16

## 2018-01-22 RX ADMIN — CEFEPIME 100 MILLIGRAM(S): 1 INJECTION, POWDER, FOR SOLUTION INTRAMUSCULAR; INTRAVENOUS at 17:34

## 2018-01-22 RX ADMIN — Medication 1 TABLET(S): at 12:24

## 2018-01-22 RX ADMIN — Medication 6: at 12:19

## 2018-01-22 NOTE — BEHAVIORAL HEALTH ASSESSMENT NOTE - NSBHCHARTREVIEWINVESTIGATE_PSY_A_CORE FT
IMPRESSION: Evaluation of the pulmonary arteries degraded by respiratory   motion. No central pulmonary embolism.    Left lower lobe endobronchial mucus impaction with underlying airspace   disease, may be combination of compressive atelectasis and pneumonia.   Small pleural effusions. Cardiomegaly.    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    Findings were discussed with Physician: ESMER PRAKASH [id number] with a   read back at 3:11 PM.                      CAMILLA LOOMIS M.D., ATTENDING RADIOLOGIST  This document has been electronically signed. Jan 21 2018  3:11PM        < end of copied text >

## 2018-01-22 NOTE — BEHAVIORAL HEALTH ASSESSMENT NOTE - NSBHCONSULTRECOMMENDOTHER_PSY_A_CORE FT
Atarax 10mg q8h PRN for anxiety    Advise surrogate consent from son who is her healthcare proxy and familiar with mother's wishes regarding her healthcare. Atarax 10mg q8h PRN for anxiety  hospice / Palliative medicine consult  Advise surrogate consent from son who is her healthcare proxy and familiar with mother's wishes regarding her healthcare.  MOLST form in chart on alpha

## 2018-01-22 NOTE — BEHAVIORAL HEALTH ASSESSMENT NOTE - SUMMARY
Pt is an yo F with PMHx of HTN, HLD, DM, Neuropathy, Arthritis, Anxiety disorder, pressure ulcers, glaucoma, and suspected dementia with superimposed delirium due to current infection who presented to hospital after NH was concerned for respiratory distress and tachypnea. Pt has cancer with possible hepatic metastasis and does not want any more tests or invasive treatments. Pt is easily confused but adamant about her wishes, and son (healthcare proxy) echoes her wishes.

## 2018-01-22 NOTE — BEHAVIORAL HEALTH ASSESSMENT NOTE - NSBHCHARTREVIEWLAB_PSY_A_CORE FT
9.6    8.3   )-----------( 560      ( 22 Jan 2018 05:54 )             29.1     01-22    132<L>  |  96<L>  |  18.0  ----------------------------<  184<H>  4.9   |  25.0  |  0.91    Ca    9.2      22 Jan 2018 05:54    TPro  5.3<L>  /  Alb  1.8<L>  /  TBili  0.2<L>  /  DBili  x   /  AST  28  /  ALT  7   /  AlkPhos  116  01-21    LIVER FUNCTIONS - ( 21 Jan 2018 06:29 )  Alb: 1.8 g/dL / Pro: 5.3 g/dL / ALK PHOS: 116 U/L / ALT: 7 U/L / AST: 28 U/L / GGT: x

## 2018-01-22 NOTE — PROGRESS NOTE ADULT - SUBJECTIVE AND OBJECTIVE BOX
DAVION QUACH Female 84y [id number]    Patient is a 84y old  Female who presents with a chief complaint of Sent from NH for respiratory distress. (20 Jan 2018 10:22)      Subjective/objective:  Pt seen and examined at bedside, no over night event reported by night staff. This morning patient reports I want to go back to NH where I live, reports I am fine, denies any complaints. I discussed about CT chest finding of suspected abdomen malignancy and needs of CT abdomen /pelvis to assess the lesion but patient refused for CT abdomen, reports I knew I have abdomen cancer and I do not want further testing for it- reports I just want to go back to facility. I called her son number but did not . Psych consulted for decision making capacity.      Review of system:  No fever, chills, nausea, vomiting, headache, dizziness, chest pain, SOB or palpitation.      PHYSICAL EXAM:    Vital Signs Last 24 Hrs  T(C): 36.7 (22 Jan 2018 11:00), Max: 36.8 (21 Jan 2018 15:45)  T(F): 98 (22 Jan 2018 11:00), Max: 98.3 (21 Jan 2018 15:45)  HR: 111 (22 Jan 2018 11:00) (77 - 111)  BP: 123/65 (22 Jan 2018 11:00) (107/66 - 153/69)  BP(mean): --  RR: 18 (22 Jan 2018 11:00) (2 - 20)  SpO2: 97% (22 Jan 2018 11:00) (97% - 97%)      GENERAL: Pt lying comfortably, NAD.  CHEST/LUNG: Clear to auscultation bilaterally; No wheezing.  HEART: S1S2+, Regular rate and rhythm; No murmurs.  ABDOMEN: Soft, Nontender, Nondistended; Bowel sounds present.  NEURO: AAOX2, no focal deficits, no motor r sensory loss.  Skin: Sacral ulcer II stage / right buttock redness      MEDICATIONS  (STANDING):  ALPRAZolam 0.25 milliGRAM(s) Oral at bedtime  brimonidine 0.2% Ophthalmic Solution 1 Drop(s) Both EYES two times a day  cefepime  IVPB 2000 milliGRAM(s) IV Intermittent <User Schedule>  dextrose 5%. 1000 milliLiter(s) (50 mL/Hr) IV Continuous <Continuous>  dextrose 50% Injectable 12.5 Gram(s) IV Push once  dextrose 50% Injectable 25 Gram(s) IV Push once  dextrose 50% Injectable 25 Gram(s) IV Push once  docusate sodium 100 milliGRAM(s) Oral daily  insulin lispro (HumaLOG) corrective regimen sliding scale   SubCutaneous three times a day before meals  metroNIDAZOLE 0.75% Gel 1 Application(s) Topical two times a day  multivitamin 1 Tablet(s) Oral daily  mupirocin 2% Ointment 1 Application(s) Topical two times a day  oxyCODONE    5 mG/acetaminophen 325 mG 1 Tablet(s) Oral every 8 hours  saccharomyces boulardii 250 milliGRAM(s) Oral two times a day  simvastatin 20 milliGRAM(s) Oral at bedtime  timolol 0.5% Solution 1 Drop(s) Both EYES two times a day    MEDICATIONS  (PRN):  dextrose Gel 1 Dose(s) Oral once PRN Blood Glucose LESS THAN 70 milliGRAM(s)/deciliter  glucagon  Injectable 1 milliGRAM(s) IntraMuscular once PRN Glucose LESS THAN 70 milligrams/deciliter        Labs:  LABS:                        9.6    8.3   )-----------( 560      ( 22 Jan 2018 05:54 )             29.1     01-22    132<L>  |  96<L>  |  18.0  ----------------------------<  184<H>  4.9   |  25.0  |  0.91    Ca    9.2      22 Jan 2018 05:54    TPro  5.3<L>  /  Alb  1.8<L>  /  TBili  0.2<L>  /  DBili  x   /  AST  28  /  ALT  7   /  AlkPhos  116  01-21        LIVER FUNCTIONS - ( 21 Jan 2018 06:29 )  Alb: 1.8 g/dL / Pro: 5.3 g/dL / ALK PHOS: 116 U/L / ALT: 7 U/L / AST: 28 U/L / GGT: x

## 2018-01-22 NOTE — PROGRESS NOTE ADULT - ASSESSMENT
Pt is very poor historian and unable to provide meaningful history. History obtained from ED staff, EMR and NH charts. Briefly she is 83 y/o female with PMHx of HTN, HLD, DM, Neuropathy, Arthritis,  Anxiety disorder, pressure sacral ulcers, glaucoma, was sent to ED from NH for respiratory distress. Per history pt was noted to have tachypnea, laboured breathing at NH and noted to have hypoglycemia 51 by EMS and glucagon was given en-route to ED. Upon arrival to ED she was very tachypnea and patient was placed on CPAP. During my evaluation patient on NC oxygen, NAD. IN ED noted to have leukocytosis, hypoglycemia, hypothermia kept on warming blanket, high Pro-BNP, UA /CXR unremarkable. Admitted to medicine for respiratory distress and hypoglycemia. Started on empiric abx for suspected PNA. CT chest shows b/l effusion, mucous plugging and possible superimposed PNA- abx continued. Hypoglycemia /hypothermia resolved.   1/22- CT chest shows suspected abdomen malignancy and CT abdomen /pelvis ordered to assess the lesion but patient refused for CT abdomen, reports I knew I have abdomen cancer and I do not want further testing for it- reports I just want to go back to facility. I called her son number but did not . Psych consulted for decision making capacity.      Plan:    Acute hypoxic Respiratory distress likely from PNA with mucus plug:  - CT chest shows b/l effusion, mucous plugging and possible superimposed PNA. Normal Ef on echo.   - Supplement oxygen via NC, BIPAP as needed  - Will c/w IV abx for PNA with florastor.  - Blood cx CoNS likely contaminated- repeat cx ordered.    Hypoglycemia / Hypothermia:  - hypoglycemia can be related to poor PO intake and also pt on antidiabetic meds. Hypothermia can be from hypoglycemia.   - Hypothermia / hypoglycemia resolved.   - D/c Dextrose, hypoglycemia w/u in process.   - TSH normal, A1C 7.2 tight control given her age.    Acute hyponatremia:  - Monitor, start NS.    Chronic condition HTN /HLD /DM / neuropathy / arhtirits / Glaucoma /anxiety / Pressure ulcer:  - Resume home meds except oral hypoglycemic / antihypertensive drugs for now.  - Keep on LSS with Fs monitoring for DM. Currently hypoglycemic- will keep on D5 for now.  - Nursing care for sacral ulcers.    Code status:  - DNR /DNI- MOLST form from NH in chart.

## 2018-01-22 NOTE — BEHAVIORAL HEALTH ASSESSMENT NOTE - DESCRIPTION
HTN, HLD, DM, Neuropathy, Arthritis, Anxiety disorder, pressure ulcers, glaucoma, recent D&C Cancer of the uterus-endometrial with metastases, HLD, DM, Neuropathy, Arthritis, Anxiety disorder, pressure ulcers, glaucoma, recent D&C

## 2018-01-22 NOTE — BEHAVIORAL HEALTH ASSESSMENT NOTE - NSBHCHARTREVIEWVS_PSY_A_CORE FT
Vital Signs Last 24 Hrs  T(C): 36.7 (22 Jan 2018 11:00), Max: 36.8 (21 Jan 2018 15:45)  T(F): 98 (22 Jan 2018 11:00), Max: 98.3 (21 Jan 2018 15:45)  HR: 111 (22 Jan 2018 11:00) (77 - 111)  BP: 123/65 (22 Jan 2018 11:00) (107/66 - 153/69)  RR: 18 (22 Jan 2018 11:00) (2 - 20)  SpO2: 97% (22 Jan 2018 11:00) (97% - 97%)

## 2018-01-22 NOTE — BEHAVIORAL HEALTH ASSESSMENT NOTE - HPI (INCLUDE ILLNESS QUALITY, SEVERITY, DURATION, TIMING, CONTEXT, MODIFYING FACTORS, ASSOCIATED SIGNS AND SYMPTOMS)
Pt is an yo F with PMHx of HTN, HLD, DM, Neuropathy, Arthritis, Anxiety disorder, pressure ulcers, and glaucoma who presented to hospital after NH was concerned for respiratory distress and tachypnea. Pt states that she knows that she has a cancer and she does not want any more CT scans or tests, and she wants to go back to her nursing home to live out her life. She knows that it does not matter what the tests say, she does not want any invasive treatments or procedures. I noted that patient was confused about some aspects (date, place) but was able to easily recall events in her life growing up. Pt admits to being confused at times, but states that it comes with her age. Pt denies feeling depressed mood or S/H I/I/P. Pt is an yo F with PMHx of metastatic uterine cancer, HTN, HLD, DM, Neuropathy, Arthritis, Anxiety disorder, pressure ulcers, and glaucoma who presented to hospital after NH was concerned for respiratory distress and tachypnea. Pt states that she knows that she has a cancer and she does not want any more CT scans or tests, and she wants to go back to her nursing home to live out her life. She knows that it does not matter what the tests say, she does not want any invasive treatments or procedures. I noted that patient was confused about some aspects (date, place) but was able to easily recall events in her life growing up. Pt admits to being confused at times, but states that it comes with her age. Pt denies feeling depressed mood or S/H I/I/P.

## 2018-01-23 LAB
ANION GAP SERPL CALC-SCNC: 11 MMOL/L — SIGNIFICANT CHANGE UP (ref 5–17)
BUN SERPL-MCNC: 16 MG/DL — SIGNIFICANT CHANGE UP (ref 8–20)
C PEPTIDE SERPL-MCNC: <0.1 NG/ML — LOW (ref 0.9–7.1)
CALCIUM SERPL-MCNC: 9.3 MG/DL — SIGNIFICANT CHANGE UP (ref 8.6–10.2)
CHLORIDE SERPL-SCNC: 93 MMOL/L — LOW (ref 98–107)
CO2 SERPL-SCNC: 25 MMOL/L — SIGNIFICANT CHANGE UP (ref 22–29)
CREAT SERPL-MCNC: 0.81 MG/DL — SIGNIFICANT CHANGE UP (ref 0.5–1.3)
GLUCOSE BLDC GLUCOMTR-MCNC: 188 MG/DL — HIGH (ref 70–99)
GLUCOSE BLDC GLUCOMTR-MCNC: 195 MG/DL — HIGH (ref 70–99)
GLUCOSE BLDC GLUCOMTR-MCNC: 223 MG/DL — HIGH (ref 70–99)
GLUCOSE BLDC GLUCOMTR-MCNC: 237 MG/DL — HIGH (ref 70–99)
GLUCOSE SERPL-MCNC: 194 MG/DL — HIGH (ref 70–115)
HCT VFR BLD CALC: 29.4 % — LOW (ref 37–47)
HGB BLD-MCNC: 9.7 G/DL — LOW (ref 12–16)
INSULIN FREE SERPL-ACNC: ABNORMAL
MCHC RBC-ENTMCNC: 30 PG — SIGNIFICANT CHANGE UP (ref 27–31)
MCHC RBC-ENTMCNC: 33 G/DL — SIGNIFICANT CHANGE UP (ref 32–36)
MCV RBC AUTO: 91 FL — SIGNIFICANT CHANGE UP (ref 81–99)
PLATELET # BLD AUTO: 591 K/UL — HIGH (ref 150–400)
POTASSIUM SERPL-MCNC: 4.6 MMOL/L — SIGNIFICANT CHANGE UP (ref 3.5–5.3)
POTASSIUM SERPL-SCNC: 4.6 MMOL/L — SIGNIFICANT CHANGE UP (ref 3.5–5.3)
RBC # BLD: 3.23 M/UL — LOW (ref 4.4–5.2)
RBC # FLD: 13.5 % — SIGNIFICANT CHANGE UP (ref 11–15.6)
SODIUM SERPL-SCNC: 129 MMOL/L — LOW (ref 135–145)
WBC # BLD: 9.9 K/UL — SIGNIFICANT CHANGE UP (ref 4.8–10.8)
WBC # FLD AUTO: 9.9 K/UL — SIGNIFICANT CHANGE UP (ref 4.8–10.8)

## 2018-01-23 PROCEDURE — 99233 SBSQ HOSP IP/OBS HIGH 50: CPT

## 2018-01-23 RX ADMIN — Medication 1 DROP(S): at 18:14

## 2018-01-23 RX ADMIN — BRIMONIDINE TARTRATE 1 DROP(S): 2 SOLUTION/ DROPS OPHTHALMIC at 06:13

## 2018-01-23 RX ADMIN — OXYCODONE AND ACETAMINOPHEN 1 TABLET(S): 5; 325 TABLET ORAL at 06:13

## 2018-01-23 RX ADMIN — CEFEPIME 100 MILLIGRAM(S): 1 INJECTION, POWDER, FOR SOLUTION INTRAMUSCULAR; INTRAVENOUS at 17:10

## 2018-01-23 RX ADMIN — Medication 2: at 12:08

## 2018-01-23 RX ADMIN — Medication 100 MILLIGRAM(S): at 12:09

## 2018-01-23 RX ADMIN — METRONIDAZOLE 1 APPLICATION(S): 7.5 GEL VAGINAL at 17:10

## 2018-01-23 RX ADMIN — MUPIROCIN 1 APPLICATION(S): 20 OINTMENT TOPICAL at 17:11

## 2018-01-23 RX ADMIN — OXYCODONE AND ACETAMINOPHEN 1 TABLET(S): 5; 325 TABLET ORAL at 22:13

## 2018-01-23 RX ADMIN — METRONIDAZOLE 1 APPLICATION(S): 7.5 GEL VAGINAL at 06:12

## 2018-01-23 RX ADMIN — OXYCODONE AND ACETAMINOPHEN 1 TABLET(S): 5; 325 TABLET ORAL at 06:47

## 2018-01-23 RX ADMIN — SIMVASTATIN 20 MILLIGRAM(S): 20 TABLET, FILM COATED ORAL at 00:11

## 2018-01-23 RX ADMIN — OXYCODONE AND ACETAMINOPHEN 1 TABLET(S): 5; 325 TABLET ORAL at 21:13

## 2018-01-23 RX ADMIN — Medication 2: at 17:11

## 2018-01-23 RX ADMIN — OXYCODONE AND ACETAMINOPHEN 1 TABLET(S): 5; 325 TABLET ORAL at 00:00

## 2018-01-23 RX ADMIN — Medication 250 MILLIGRAM(S): at 06:12

## 2018-01-23 RX ADMIN — MUPIROCIN 1 APPLICATION(S): 20 OINTMENT TOPICAL at 06:12

## 2018-01-23 RX ADMIN — OXYCODONE AND ACETAMINOPHEN 1 TABLET(S): 5; 325 TABLET ORAL at 00:11

## 2018-01-23 RX ADMIN — SIMVASTATIN 20 MILLIGRAM(S): 20 TABLET, FILM COATED ORAL at 22:16

## 2018-01-23 RX ADMIN — Medication 1 TABLET(S): at 12:11

## 2018-01-23 RX ADMIN — Medication 250 MILLIGRAM(S): at 17:12

## 2018-01-23 RX ADMIN — OXYCODONE AND ACETAMINOPHEN 1 TABLET(S): 5; 325 TABLET ORAL at 14:55

## 2018-01-23 RX ADMIN — OXYCODONE AND ACETAMINOPHEN 1 TABLET(S): 5; 325 TABLET ORAL at 14:10

## 2018-01-23 RX ADMIN — Medication 4: at 08:04

## 2018-01-23 RX ADMIN — BRIMONIDINE TARTRATE 1 DROP(S): 2 SOLUTION/ DROPS OPHTHALMIC at 18:14

## 2018-01-23 RX ADMIN — Medication 1 DROP(S): at 06:13

## 2018-01-23 NOTE — PROGRESS NOTE ADULT - ASSESSMENT
Acute hypoxic Respiratory distress likely from PNA with mucus plug:  - CT chest shows b/l effusion, mucous plugging and possible superimposed PNA. Normal Ef on echo.   - Supplement oxygen via NC, BIPAP as needed  - Will c/w IV abx for PNA with florastor.  - Blood cx CoNS likely contaminated-   repeat cx ordered -afebrile at present  Consult for capacity obtained from - rec appreciated  Pt's son is the proxy    Atarax 10mg q8h PRN for anxiety   Palliative medicine consult called      Hypoglycemia / Hypothermia:  - hypoglycemia can be related to poor PO intake and also pt on antidiabetic meds.   Hypothermia can be from hypoglycemia.   - Hypothermia / hypoglycemia resolved.   - D/c Dextrose, hypoglycemia w/u in process.   - TSH normal, A1C 7.2 tight control given her age.    Acute hyponatremia:  - Monitor, start NS.    Chronic condition HTN /HLD /DM / neuropathy / arhtirits / Glaucoma /anxiety / Pressure ulcer:  - Resume home meds except oral hypoglycemic / antihypertensive drugs for now.  - Keep on LSS with Fs monitoring for DM. Currently hypoglycemic- will keep on D5 for now.  - Nursing care for sacral ulcers.    Code status:  - DNR /DNI- MOLST form from NH in chart. Acute hypoxic Respiratory distress likely from PNA with mucus plug:  - CT chest shows b/l effusion, mucous plugging and possible superimposed PNA. Normal Ef on echo.   - Cefepime 2000mg IVPB  -Supplement oxygen via NC, BIPAP as needed  - Will c/w IV abx for PNA with florastor.  - Blood cx CoNS likely contaminated-   repeat cx ordered -afebrile at present    Consult for capacity obtained from - rec appreciated  Pt's son is the proxy  Atarax 10mg q8h PRN for anxiety   Palliative medicine consult called      Hypoglycemia / Hypothermia:  -Consistent carbohydrate diet  Accuchecks with HISS coverage   hypoglycemia can be related to poor PO intake and also pt on antidiabetic meds.   Hypothermia can be from hypoglycemia.   - Hypothermia / hypoglycemia resolved.   - D/c Dextrose, hypoglycemia w/u in process.   - TSH normal, A1C 7.2 tight control given her age.    Acute hyponatremia:  - Monitor, start NS.    Chronic condition HTN /HLD /DM / neuropathy / arhtirits / Glaucoma /anxiety / Pressure ulcer:  - Resume home meds except oral hypoglycemic / antihypertensive drugs for now.  - Keep on LSS with Fs monitoring for DM. Currently hypoglycemic- will keep on D5 for now.  - Nursing care for sacral ulcers.    Code status:  - DNR /DNI- MOLST form from NH in chart. Pt is very poor historian and unable to provide meaningful history. History obtained from ED staff, EMR and NH charts. Briefly she is 85 y/o female with PMHx of HTN, HLD, DM, Neuropathy, Arthritis,  Anxiety disorder, pressure sacral ulcers, glaucoma, was sent to ED from NH for respiratory distress. Per history pt was noted to have tachypnea, laboured breathing at NH and noted to have hypoglycemia 51 by EMS and glucagon was given en-route to ED. Upon arrival to ED she was very tachypnea and patient was placed on CPAP. During my evaluation patient on NC oxygen, NAD. IN ED noted to have leukocytosis, hypoglycemia, hypothermia kept on warming blanket, high Pro-BNP, UA /CXR unremarkable. Admitted to medicine for respiratory distress and hypoglycemia. Started on empiric abx for suspected PNA. CT chest shows b/l effusion, mucous plugging and possible superimposed PNA- abx continued. Hypoglycemia /hypothermia resolved.   CT chest shows suspected abdomen malignancy and CT abdomen /pelvis ordered to assess the lesion but patient refused for CT abdomen, reports I knew I have abdomen cancer and I do not want further testing for it- reports I just want to go back to facility. Per son patient has known uterine malignancy and refused further work up.         Acute hypoxic Respiratory distress likely from PNA with mucus plug:  - CT chest shows b/l effusion, mucous plugging and possible superimposed PNA. Normal Ef on echo.   - Cefepime 2000mg IVPB  - O2 weaned off.  - Will c/w IV abx for PNA with florastor.  - Blood cx CoNS likely contaminated- second set in process.    Consult for capacity obtained from Dr. Barnard- rec appreciated  Pt's son is the proxy  Atarax 10mg q8h PRN for anxiety        Hypoglycemia / Hypothermia:  - Resolved. Hypoglycemia from poor PO intake and also pt on antidiabetic meds.  - TSH normal, A1C 7.2 tight control given her age.  - LSS with FS monitoring.    Acute hyponatremia:  - Monitor    Chronic condition HTN /HLD /DM / neuropathy / arhtirits / Glaucoma /anxiety / Pressure ulcer:  - Resume home meds except oral hypoglycemic / antihypertensive drugs for now.  - Keep on LSS with Fs monitoring for DM. Currently hypoglycemic- will keep on D5 for now.  - Nursing care for sacral ulcers.    Code status:  - DNR /DNI- MOLST form from NH in chart.

## 2018-01-23 NOTE — PROGRESS NOTE ADULT - SUBJECTIVE AND OBJECTIVE BOX
Patient is a 84y old  Female who presents with a chief complaint of Sent from NH for respiratory distress. (20 Jan 2018 10:22)      HPI:  Pt is very poor historian and unable to provide meaningful history. History obtained from ED staff, EMR and NH charts. Briefly she is 85 y/o female with PMHx of HTN, HLD, DM, Neuropathy, Arthritis,  Anxiety disorder, pressure ulcers, glaucoma, was sent to ED from NH for respiratory distress. Per history pt was noted to have tachypnea, laboured breathing at NH and noted to have hypoglycemia 51 by EMS and glucagon was given en-route to ED. Upon arrival to ED she was very tachypnea and patient was placed on CPAP. During my evaluation patient on NC oxygen, NAD, reports she is fine and at baseline, she denied any breathing problems but at the same time is AOX2- poor historian. (20 Jan 2018 10:22)    FAMILY HISTORY:  No pertinent family history in first degree relatives      INTERVAL HPI/OVERNIGHT EVENTS:  Pt. is somewhat less anxious today, denies any pain  +cough    PAST MEDICAL & SURGICAL HISTORY:  Skin ulcer, unspecified ulcer stage  Glaucoma, unspecified glaucoma type, unspecified laterality  Chronic back pain, unspecified back location, unspecified back pain laterality  Arthritis  Diabetes 1.5, managed as type 2  Pure hypercholesterolemia  Essential hypertension      Allergies    No Known Allergies    Intolerances        Vital Signs Last 24 Hrs  T(C): 36.7 (23 Jan 2018 08:21), Max: 36.8 (22 Jan 2018 22:15)  T(F): 98 (23 Jan 2018 08:21), Max: 98.2 (22 Jan 2018 22:15)  HR: 106 (23 Jan 2018 08:21) (86 - 110)  BP: 138/68 (23 Jan 2018 08:21) (128/60 - 162/74)  BP(mean): --  RR: 18 (23 Jan 2018 08:21) (18 - 20)  SpO2: 95% (23 Jan 2018 08:21) (95% - 95%)                                9.6    8.3   )-----------( 560      ( 22 Jan 2018 05:54 )             29.1             RADIOLOGY & ADDITIONAL STUDIES:    MEDICATIONS:  ALPRAZolam 0.25 milliGRAM(s) Oral at bedtime  brimonidine 0.2% Ophthalmic Solution 1 Drop(s) Both EYES two times a day  cefepime  IVPB 2000 milliGRAM(s) IV Intermittent <User Schedule>  dextrose 5%. 1000 milliLiter(s) IV Continuous <Continuous>  dextrose 50% Injectable 12.5 Gram(s) IV Push once  dextrose 50% Injectable 25 Gram(s) IV Push once  dextrose 50% Injectable 25 Gram(s) IV Push once  dextrose Gel 1 Dose(s) Oral once PRN  docusate sodium 100 milliGRAM(s) Oral daily  glucagon  Injectable 1 milliGRAM(s) IntraMuscular once PRN  hydrOXYzine hydrochloride 10 milliGRAM(s) Oral every 8 hours PRN  insulin lispro (HumaLOG) corrective regimen sliding scale   SubCutaneous three times a day before meals  metroNIDAZOLE 0.75% Gel 1 Application(s) Topical two times a day  multivitamin 1 Tablet(s) Oral daily  mupirocin 2% Ointment 1 Application(s) Topical two times a day  oxyCODONE    5 mG/acetaminophen 325 mG 1 Tablet(s) Oral every 8 hours  saccharomyces boulardii 250 milliGRAM(s) Oral two times a day  simvastatin 20 milliGRAM(s) Oral at bedtime  sodium chloride 0.9%. 1000 milliLiter(s) IV Continuous <Continuous>  timolol 0.5% Solution 1 Drop(s) Both EYES two times a day DAVION QUACH Female 84y [id number]    Patient is a 84y old  Female who presents with a chief complaint of Sent from NH for respiratory distress. (20 Jan 2018 10:22)    Subjective/objective:  Pt seen /examined at bedside, clinically stable with some dementia. No complaints, reports wants to go home. Discharge pending repeat blood cx- in process.       Review of system:  Denied any fever, chills, nausea, vomiting.         PHYSICAL EXAM:    Vital Signs Last 24 Hrs  T(C): 36.9 (23 Jan 2018 16:14), Max: 36.9 (23 Jan 2018 16:14)  T(F): 98.5 (23 Jan 2018 16:14), Max: 98.5 (23 Jan 2018 16:14)  HR: 98 (23 Jan 2018 16:14) (86 - 110)  BP: 144/68 (23 Jan 2018 16:14) (128/60 - 162/74)  BP(mean): --  RR: 18 (23 Jan 2018 16:14) (18 - 20)  SpO2: 95% (23 Jan 2018 08:21) (95% - 95%)      GENERAL: Pt lying comfortably, NAD.  CHEST/LUNG: Clear to auscultation bilaterally; No wheezing.  HEART: S1S2+, Regular rate and rhythm; No murmurs.  ABDOMEN: Soft, Nontender, Nondistended; Bowel sounds present.  NEURO: AAOX2, no focal deficits, no motor r sensory loss.  Skin: Sacral ulcer II stage / right buttock redness      MEDICATIONS  (STANDING):  ALPRAZolam 0.25 milliGRAM(s) Oral at bedtime  brimonidine 0.2% Ophthalmic Solution 1 Drop(s) Both EYES two times a day  cefepime  IVPB 2000 milliGRAM(s) IV Intermittent <User Schedule>  dextrose 5%. 1000 milliLiter(s) (50 mL/Hr) IV Continuous <Continuous>  dextrose 50% Injectable 12.5 Gram(s) IV Push once  dextrose 50% Injectable 25 Gram(s) IV Push once  dextrose 50% Injectable 25 Gram(s) IV Push once  docusate sodium 100 milliGRAM(s) Oral daily  insulin lispro (HumaLOG) corrective regimen sliding scale   SubCutaneous three times a day before meals  metroNIDAZOLE 0.75% Gel 1 Application(s) Topical two times a day  multivitamin 1 Tablet(s) Oral daily  mupirocin 2% Ointment 1 Application(s) Topical two times a day  oxyCODONE    5 mG/acetaminophen 325 mG 1 Tablet(s) Oral every 8 hours  saccharomyces boulardii 250 milliGRAM(s) Oral two times a day  simvastatin 20 milliGRAM(s) Oral at bedtime  sodium chloride 0.9%. 1000 milliLiter(s) (60 mL/Hr) IV Continuous <Continuous>  timolol 0.5% Solution 1 Drop(s) Both EYES two times a day    MEDICATIONS  (PRN):  dextrose Gel 1 Dose(s) Oral once PRN Blood Glucose LESS THAN 70 milliGRAM(s)/deciliter  glucagon  Injectable 1 milliGRAM(s) IntraMuscular once PRN Glucose LESS THAN 70 milligrams/deciliter  hydrOXYzine hydrochloride 10 milliGRAM(s) Oral every 8 hours PRN Anxiety        Labs:  LABS:                        9.7    9.9   )-----------( 591      ( 23 Jan 2018 15:47 )             29.4     01-23    129<L>  |  93<L>  |  16.0  ----------------------------<  194<H>  4.6   |  25.0  |  0.81    Ca    9.3      23 Jan 2018 15:47

## 2018-01-24 DIAGNOSIS — J18.9 PNEUMONIA, UNSPECIFIED ORGANISM: ICD-10-CM

## 2018-01-24 DIAGNOSIS — C54.8 MALIGNANT NEOPLASM OF OVERLAPPING SITES OF CORPUS UTERI: ICD-10-CM

## 2018-01-24 DIAGNOSIS — R06.03 ACUTE RESPIRATORY DISTRESS: ICD-10-CM

## 2018-01-24 DIAGNOSIS — M54.9 DORSALGIA, UNSPECIFIED: ICD-10-CM

## 2018-01-24 DIAGNOSIS — K59.01 SLOW TRANSIT CONSTIPATION: ICD-10-CM

## 2018-01-24 LAB
ANION GAP SERPL CALC-SCNC: 9 MMOL/L — SIGNIFICANT CHANGE UP (ref 5–17)
BUN SERPL-MCNC: 15 MG/DL — SIGNIFICANT CHANGE UP (ref 8–20)
CALCIUM SERPL-MCNC: 9.7 MG/DL — SIGNIFICANT CHANGE UP (ref 8.6–10.2)
CHLORIDE SERPL-SCNC: 96 MMOL/L — LOW (ref 98–107)
CO2 SERPL-SCNC: 25 MMOL/L — SIGNIFICANT CHANGE UP (ref 22–29)
CREAT SERPL-MCNC: 0.83 MG/DL — SIGNIFICANT CHANGE UP (ref 0.5–1.3)
GLUCOSE BLDC GLUCOMTR-MCNC: 130 MG/DL — HIGH (ref 70–99)
GLUCOSE BLDC GLUCOMTR-MCNC: 159 MG/DL — HIGH (ref 70–99)
GLUCOSE BLDC GLUCOMTR-MCNC: 208 MG/DL — HIGH (ref 70–99)
GLUCOSE BLDC GLUCOMTR-MCNC: 231 MG/DL — HIGH (ref 70–99)
GLUCOSE SERPL-MCNC: 181 MG/DL — HIGH (ref 70–115)
POTASSIUM SERPL-MCNC: 4.5 MMOL/L — SIGNIFICANT CHANGE UP (ref 3.5–5.3)
POTASSIUM SERPL-SCNC: 4.5 MMOL/L — SIGNIFICANT CHANGE UP (ref 3.5–5.3)
PROINSULIN SERPL-MCNC: 1 PMOL/L — SIGNIFICANT CHANGE UP (ref 0–10)
SODIUM SERPL-SCNC: 130 MMOL/L — LOW (ref 135–145)

## 2018-01-24 PROCEDURE — 99223 1ST HOSP IP/OBS HIGH 75: CPT

## 2018-01-24 PROCEDURE — 99233 SBSQ HOSP IP/OBS HIGH 50: CPT

## 2018-01-24 RX ORDER — METOCLOPRAMIDE HCL 10 MG
10 TABLET ORAL THREE TIMES A DAY
Qty: 0 | Refills: 0 | Status: DISCONTINUED | OUTPATIENT
Start: 2018-01-24 | End: 2018-01-25

## 2018-01-24 RX ORDER — OXYCODONE AND ACETAMINOPHEN 5; 325 MG/1; MG/1
1 TABLET ORAL EVERY 4 HOURS
Qty: 0 | Refills: 0 | Status: DISCONTINUED | OUTPATIENT
Start: 2018-01-24 | End: 2018-01-25

## 2018-01-24 RX ORDER — SENNA PLUS 8.6 MG/1
2 TABLET ORAL AT BEDTIME
Qty: 0 | Refills: 0 | Status: DISCONTINUED | OUTPATIENT
Start: 2018-01-24 | End: 2018-01-25

## 2018-01-24 RX ORDER — POLYETHYLENE GLYCOL 3350 17 G/17G
17 POWDER, FOR SOLUTION ORAL AT BEDTIME
Qty: 0 | Refills: 0 | Status: DISCONTINUED | OUTPATIENT
Start: 2018-01-24 | End: 2018-01-25

## 2018-01-24 RX ADMIN — Medication 4: at 12:37

## 2018-01-24 RX ADMIN — MUPIROCIN 1 APPLICATION(S): 20 OINTMENT TOPICAL at 06:31

## 2018-01-24 RX ADMIN — Medication 1 TABLET(S): at 12:36

## 2018-01-24 RX ADMIN — OXYCODONE AND ACETAMINOPHEN 1 TABLET(S): 5; 325 TABLET ORAL at 07:42

## 2018-01-24 RX ADMIN — OXYCODONE AND ACETAMINOPHEN 1 TABLET(S): 5; 325 TABLET ORAL at 23:09

## 2018-01-24 RX ADMIN — Medication 0.25 MILLIGRAM(S): at 22:09

## 2018-01-24 RX ADMIN — MUPIROCIN 1 APPLICATION(S): 20 OINTMENT TOPICAL at 17:07

## 2018-01-24 RX ADMIN — Medication 10 MILLIGRAM(S): at 22:09

## 2018-01-24 RX ADMIN — Medication 250 MILLIGRAM(S): at 17:06

## 2018-01-24 RX ADMIN — SIMVASTATIN 20 MILLIGRAM(S): 20 TABLET, FILM COATED ORAL at 22:08

## 2018-01-24 RX ADMIN — Medication 4: at 08:27

## 2018-01-24 RX ADMIN — POLYETHYLENE GLYCOL 3350 17 GRAM(S): 17 POWDER, FOR SOLUTION ORAL at 22:09

## 2018-01-24 RX ADMIN — CEFEPIME 100 MILLIGRAM(S): 1 INJECTION, POWDER, FOR SOLUTION INTRAMUSCULAR; INTRAVENOUS at 17:07

## 2018-01-24 RX ADMIN — Medication 2: at 17:08

## 2018-01-24 RX ADMIN — SENNA PLUS 2 TABLET(S): 8.6 TABLET ORAL at 22:09

## 2018-01-24 RX ADMIN — METRONIDAZOLE 1 APPLICATION(S): 7.5 GEL VAGINAL at 06:29

## 2018-01-24 RX ADMIN — OXYCODONE AND ACETAMINOPHEN 1 TABLET(S): 5; 325 TABLET ORAL at 14:54

## 2018-01-24 RX ADMIN — Medication 250 MILLIGRAM(S): at 06:31

## 2018-01-24 RX ADMIN — BRIMONIDINE TARTRATE 1 DROP(S): 2 SOLUTION/ DROPS OPHTHALMIC at 06:29

## 2018-01-24 RX ADMIN — OXYCODONE AND ACETAMINOPHEN 1 TABLET(S): 5; 325 TABLET ORAL at 22:09

## 2018-01-24 RX ADMIN — Medication 1 DROP(S): at 06:29

## 2018-01-24 RX ADMIN — OXYCODONE AND ACETAMINOPHEN 1 TABLET(S): 5; 325 TABLET ORAL at 06:42

## 2018-01-24 RX ADMIN — Medication 1 DROP(S): at 17:07

## 2018-01-24 RX ADMIN — Medication 100 MILLIGRAM(S): at 12:35

## 2018-01-24 RX ADMIN — OXYCODONE AND ACETAMINOPHEN 1 TABLET(S): 5; 325 TABLET ORAL at 13:53

## 2018-01-24 RX ADMIN — METRONIDAZOLE 1 APPLICATION(S): 7.5 GEL VAGINAL at 17:07

## 2018-01-24 RX ADMIN — BRIMONIDINE TARTRATE 1 DROP(S): 2 SOLUTION/ DROPS OPHTHALMIC at 17:06

## 2018-01-24 NOTE — PROGRESS NOTE ADULT - ASSESSMENT
Pt is very poor historian and unable to provide meaningful history. History obtained from ED staff, EMR and NH charts. Briefly she is 85 y/o female with PMHx of HTN, HLD, DM, Neuropathy, Arthritis,  Anxiety disorder, pressure sacral ulcers, glaucoma, was sent to ED from NH for respiratory distress. Per history pt was noted to have tachypnea, laboured breathing at NH and noted to have hypoglycemia 51 by EMS and glucagon was given en-route to ED. Upon arrival to ED she was very tachypnea and patient was placed on CPAP. During my evaluation patient on NC oxygen, NAD. IN ED noted to have leukocytosis, hypoglycemia, hypothermia kept on warming blanket, high Pro-BNP, UA /CXR unremarkable. Admitted to medicine for respiratory distress and hypoglycemia. Started on empiric abx for suspected PNA. CT chest shows b/l effusion, mucous plugging and possible superimposed PNA- abx continued. Hypoglycemia /hypothermia resolved.   CT chest shows suspected abdomen malignancy and CT abdomen /pelvis ordered to assess the lesion but patient refused for CT abdomen, reports I knew I have abdomen cancer and I do not want further testing for it- reports I just want to go back to facility. Per son patient has known uterine malignancy and refused further work up.         Acute hypoxic Respiratory distress likely from PNA with mucus plug:  - CT chest shows b/l effusion, mucous plugging and possible superimposed PNA. Normal Ef on echo.   - Cefepime 2000mg IVPB  - O2 weaned off.  - Will c/w IV abx for PNA with florastor.  - Blood cx CoNS likely contaminated- second set in negative.     Consult for capacity obtained from Dr. Barnard- rec appreciated  Pt's son is the proxy.  Atarax 10mg q8h PRN for anxiety        Hypoglycemia / Hypothermia:  - Resolved. Hypoglycemia from poor PO intake and also pt on antidiabetic meds.  - TSH normal, A1C 7.2 tight control given her age.  - LSS with FS monitoring.    Acute hyponatremia:  - Monitor    Chronic condition HTN /HLD /DM / neuropathy / arhtirits / Glaucoma /anxiety / Pressure ulcer:  - Resume home meds except oral hypoglycemic / antihypertensive drugs for now.  - Keep on LSS with Fs monitoring for DM. Currently hypoglycemic- will keep on D5 for now.  - Nursing care for sacral ulcers.    Code status:  - DNR /DNI- MOLST form from NH in chart. Pt is very poor historian and unable to provide meaningful history. History obtained from ED staff, EMR and NH charts. Briefly she is 85 y/o female with PMHx of HTN, HLD, DM, Neuropathy, Arthritis,  Anxiety disorder, pressure sacral ulcers, glaucoma, was sent to ED from NH for respiratory distress. Per history pt was noted to have tachypnea, laboured breathing at NH and noted to have hypoglycemia 51 by EMS and glucagon was given en-route to ED. Upon arrival to ED she was very tachypnea and patient was placed on CPAP. During my evaluation patient on NC oxygen, NAD. IN ED noted to have leukocytosis, hypoglycemia, hypothermia kept on warming blanket, high Pro-BNP, UA /CXR unremarkable. Admitted to medicine for respiratory distress and hypoglycemia. Started on empiric abx for suspected PNA. CT chest shows b/l effusion, mucous plugging and possible superimposed PNA- abx continued. Hypoglycemia /hypothermia resolved.   CT chest shows suspected abdomen malignancy and CT abdomen /pelvis ordered to assess the lesion but patient refused for CT abdomen, reports I knew I have abdomen cancer and I do not want further testing for it- reports I just want to go back to facility. Per son patient has known uterine malignancy and refused further work up.         Acute hypoxic Respiratory distress likely from PNA probable due to gram positive and gram negative organism with mucus plug:  - CT chest shows b/l effusion, mucous plugging and possible superimposed PNA. Normal Ef on echo.   - Cefepime 2000mg IVPB  - O2 weaned off.  - Will c/w IV abx for PNA with florastor.  - Blood cx CoNS likely contaminated- second set in negative.     UTI:  - No sx but urine cx came out positive with citrobacter- sensitive to cefepime.        Hypoglycemia / Hypothermia:  - Resolved. Hypoglycemia from poor PO intake and also pt on antidiabetic meds.  - TSH normal, A1C 7.2 tight control given her age.  - LSS with FS monitoring.    Acute hyponatremia:  - Monitor    Chronic condition HTN /HLD /DM / neuropathy / arhtirits / Glaucoma /anxiety / Pressure ulcer:  - Resume home meds except oral hypoglycemic / antihypertensive drugs for now.  - Keep on LSS with Fs monitoring for DM. Currently hypoglycemic- will keep on D5 for now.  - Nursing care for sacral ulcers.    Code status:  - DNR /DNI- MOLST form from NH in chart.

## 2018-01-24 NOTE — CONSULT NOTE ADULT - ATTENDING COMMENTS
COUNSELING:    Face to face meeting to discuss Advanced Care Planning - Time Spent ______ Minutes.  See goals of care note.    More than 50% time spent in counseling and coordinating care. 30______ Minutes.     Thank you for the opportunity to assist with the care of this patient.   [facility] [phone].

## 2018-01-24 NOTE — PROGRESS NOTE ADULT - SUBJECTIVE AND OBJECTIVE BOX
DAVION QUACH Female 84y [id number]    Patient is a 84y old  Female who presents with a chief complaint of Sent from NH for respiratory distress. (20 Jan 2018 10:22)      Subjective/objective:  Pt seen and examined at bedside, no over night event reported by night staff. Pt with underlying dementia, admitted for respiratory distress- resolved now. Pt denied any more SOB, not on oxygen any more. First cx CoNS likely contaminated- second set negative so far. Pt to go back to NH pending insurance auth.    Review of system:  No fever, chills, nausea, vomiting, headache, dizziness, chest pain.      PHYSICAL EXAM:    Vital Signs Last 24 Hrs  T(C): 36.9 (24 Jan 2018 11:15), Max: 37 (23 Jan 2018 21:53)  T(F): 98.4 (24 Jan 2018 11:15), Max: 98.6 (23 Jan 2018 21:53)  HR: 94 (24 Jan 2018 11:15) (80 - 106)  BP: 149/72 (24 Jan 2018 11:15) (121/70 - 158/80)  BP(mean): --  RR: 17 (24 Jan 2018 11:15) (17 - 18)  SpO2: 97% (24 Jan 2018 11:15) (94% - 99%)      GENERAL: Pt lying comfortably, NAD.  CHEST/LUNG: Clear to auscultation bilaterally; No wheezing.  HEART: S1S2+, Regular rate and rhythm; No murmurs.  ABDOMEN: Soft, Nontender, Nondistended; Bowel sounds present.  NEURO: AAOX2, no focal deficits, no motor r sensory loss.  Skin: Sacral ulcer II stage / right buttock redness      MEDICATIONS  (STANDING):  ALPRAZolam 0.25 milliGRAM(s) Oral at bedtime  bisacodyl Suppository 10 milliGRAM(s) Rectal once  brimonidine 0.2% Ophthalmic Solution 1 Drop(s) Both EYES two times a day  cefepime  IVPB 2000 milliGRAM(s) IV Intermittent <User Schedule>  dextrose 5%. 1000 milliLiter(s) (50 mL/Hr) IV Continuous <Continuous>  dextrose 50% Injectable 12.5 Gram(s) IV Push once  dextrose 50% Injectable 25 Gram(s) IV Push once  dextrose 50% Injectable 25 Gram(s) IV Push once  docusate sodium 100 milliGRAM(s) Oral daily  insulin lispro (HumaLOG) corrective regimen sliding scale   SubCutaneous three times a day before meals  metoclopramide 10 milliGRAM(s) Oral three times a day  metroNIDAZOLE 0.75% Gel 1 Application(s) Topical two times a day  multivitamin 1 Tablet(s) Oral daily  mupirocin 2% Ointment 1 Application(s) Topical two times a day  oxyCODONE    5 mG/acetaminophen 325 mG 1 Tablet(s) Oral every 8 hours  polyethylene glycol 3350 17 Gram(s) Oral at bedtime  saccharomyces boulardii 250 milliGRAM(s) Oral two times a day  senna 2 Tablet(s) Oral at bedtime  simvastatin 20 milliGRAM(s) Oral at bedtime  timolol 0.5% Solution 1 Drop(s) Both EYES two times a day    MEDICATIONS  (PRN):  dextrose Gel 1 Dose(s) Oral once PRN Blood Glucose LESS THAN 70 milliGRAM(s)/deciliter  glucagon  Injectable 1 milliGRAM(s) IntraMuscular once PRN Glucose LESS THAN 70 milligrams/deciliter  hydrOXYzine hydrochloride 10 milliGRAM(s) Oral every 8 hours PRN Anxiety  oxyCODONE    5 mG/acetaminophen 325 mG 1 Tablet(s) Oral every 4 hours PRN hina pain        Labs:  LABS:                        9.7    9.9   )-----------( 591      ( 23 Jan 2018 15:47 )             29.4     01-23    129<L>  |  93<L>  |  16.0  ----------------------------<  194<H>  4.6   |  25.0  |  0.81    Ca    9.3      23 Jan 2018 15:47

## 2018-01-24 NOTE — CONSULT NOTE ADULT - SUBJECTIVE AND OBJECTIVE BOX
HPI:  This is an 85yo F with PMH of Uterine Cancer, diagnosed a few months prior to this admission. She opted not to have any more invasive or curative treatment.  She was transferred fro [facility] in Respiratory Distress, being treated for PNA and Pleural Effusions. She is on Percocet Q8h for chronic pain management. Also C/O L upper lateral abdominal pain that radiates to L groin.  Works well but analgesia wears  off before she can have another dose. She C/O Nausea and wretching after eating on clear liquid diet. Admits to being constipated. She has been wheelchair bound of late.    Pt is very poor historian and unable to provide meaningful history. History obtained from ED staff, EMR and NH charts. Briefly she is 85 y/o female with PMHx of HTN, HLD, DM, Neuropathy, Arthritis,  Anxiety disorder, pressure ulcers, glaucoma, was sent to ED from NH for respiratory distress. Per history pt was noted to have tachypnea, laboured breathing at NH and noted to have hypoglycemia 51 by EMS and glucagon was given en-route to ED. Upon arrival to ED she was very tachypnea and patient was placed on CPAP. During my evaluation patient on NC oxygen, NAD, reports she is fine and at baseline, she denied any breathing problems but at the same time is AOX2- poor historian. (20 Jan 2018 10:22)      PERTINENT PMH REVIEWED: Yes     PAST MEDICAL & SURGICAL HISTORY:  Skin ulcer, unspecified ulcer stage  Glaucoma, unspecified glaucoma type, unspecified laterality  Chronic back pain, unspecified back location, unspecified back pain laterality  Arthritis  Diabetes 1.5, managed as type 2  Pure hypercholesterolemia  Essential hypertension      SOCIAL HISTORY:  EtOH  No                                    Drugs    No                                      nonsmoker                                    Admitted from: home  SNF _[facility]_ SonHCP--Yaw Mccoy [phone]    FAMILY HISTORY:  No pertinent family history in first degree relatives      Allergies    No Known Allergies    Baseline ADLs (prior to admission):   Dependent      Present Symptoms:     Dyspnea: 0    Nausea: Yes   Anxiety:  Yes   Depression: No  Fatigue: Yes   Loss of appetite: Yes     Pain:  Chronic Back pain            Character-            Duration-            Effect-            Factors-            Frequency-            Location-            Severity-abdominal pain-cancer    Review of Systems: Reviewed                        All others negative    MEDICATIONS  (STANDING):  ALPRAZolam 0.25 milliGRAM(s) Oral at bedtime  brimonidine 0.2% Ophthalmic Solution 1 Drop(s) Both EYES two times a day  cefepime  IVPB 2000 milliGRAM(s) IV Intermittent <User Schedule>  dextrose 5%. 1000 milliLiter(s) (50 mL/Hr) IV Continuous <Continuous>  dextrose 50% Injectable 12.5 Gram(s) IV Push once  dextrose 50% Injectable 25 Gram(s) IV Push once  dextrose 50% Injectable 25 Gram(s) IV Push once  docusate sodium 100 milliGRAM(s) Oral daily  insulin lispro (HumaLOG) corrective regimen sliding scale   SubCutaneous three times a day before meals  metoclopramide 10 milliGRAM(s) Oral three times a day  metroNIDAZOLE 0.75% Gel 1 Application(s) Topical two times a day  multivitamin 1 Tablet(s) Oral daily  mupirocin 2% Ointment 1 Application(s) Topical two times a day  oxyCODONE    5 mG/acetaminophen 325 mG 1 Tablet(s) Oral every 8 hours  saccharomyces boulardii 250 milliGRAM(s) Oral two times a day  simvastatin 20 milliGRAM(s) Oral at bedtime  timolol 0.5% Solution 1 Drop(s) Both EYES two times a day    MEDICATIONS  (PRN):  dextrose Gel 1 Dose(s) Oral once PRN Blood Glucose LESS THAN 70 milliGRAM(s)/deciliter  glucagon  Injectable 1 milliGRAM(s) IntraMuscular once PRN Glucose LESS THAN 70 milligrams/deciliter  hydrOXYzine hydrochloride 10 milliGRAM(s) Oral every 8 hours PRN Anxiety  oxyCODONE    5 mG/acetaminophen 325 mG 1 Tablet(s) Oral every 4 hours PRN breeakthrough pain      PHYSICAL EXAM:    Vital Signs Last 24 Hrs  T(C): 36.9 (24 Jan 2018 11:15), Max: 37 (23 Jan 2018 21:53)  T(F): 98.4 (24 Jan 2018 11:15), Max: 98.6 (23 Jan 2018 21:53)  HR: 94 (24 Jan 2018 11:15) (80 - 106)  BP: 149/72 (24 Jan 2018 11:15) (121/70 - 158/80)  BP(mean): --  RR: 17 (24 Jan 2018 11:15) (17 - 18)  SpO2: 97% (24 Jan 2018 11:15) (94% - 99%)    General: alert  oriented x _2-3___                  HEENT:   dry mouth     Lungs: comfortable      CV:   tachycardia    GI: distended  tender                  constipation  last BM: unknown      incontinent      MSK:   weakness  edema              bedbound/wheelchair bound    Skin:  pressure ulcers-See Nsg assessment  no rash    LABS:                        9.7    9.9   )-----------( 591      ( 23 Jan 2018 15:47 )             29.4     01-23    129<L>  |  93<L>  |  16.0  ----------------------------<  194<H>  4.6   |  25.0  |  0.81    Ca    9.3      23 Jan 2018 15:47    I&O's Summary      RADIOLOGY & ADDITIONAL STUDIES:    ADVANCE DIRECTIVES:   DNR YES   Completed on:                     MOLST  YES    Completed on: Nov 2017  Living Will   NO   Completed on:

## 2018-01-24 NOTE — CONSULT NOTE ADULT - ASSESSMENT
Assessment and Plan:   · Assessment		  Pt is very poor historian and unable to provide meaningful history. History obtained from ED staff, EMR and NH charts. Briefly she is 85 y/o female with PMHx of HTN, HLD, DM, Neuropathy, Arthritis,  Anxiety disorder, pressure sacral ulcers, glaucoma, was sent to ED from NH for respiratory distress. Per history pt was noted to have tachypnea, laboured breathing at NH and noted to have hypoglycemia 51 by EMS and glucagon was given en-route to ED. Upon arrival to ED she was very tachypnea and patient was placed on CPAP. During my evaluation patient on NC oxygen, NAD. IN ED noted to have leukocytosis, hypoglycemia, hypothermia kept on warming blanket, high Pro-BNP, UA /CXR unremarkable. Admitted to medicine for respiratory distress and hypoglycemia. Started on empiric abx for suspected PNA. CT chest shows b/l effusion, mucous plugging and possible superimposed PNA- abx continued. Hypoglycemia /hypothermia resolved.   CT chest shows suspected abdomen malignancy and CT abdomen /pelvis ordered to assess the lesion but patient refused for CT abdomen, reports I knew I have abdomen cancer and I do not want further testing for it- reports I just want to go back to facility. Per son patient has known uterine malignancy and refused further work up.         Acute hypoxic Respiratory distress likely from PNA with mucus plug:  - CT chest shows b/l effusion, mucous plugging and possible superimposed PNA. Normal Ef on echo.   - Cefepime 2000mg IVPB  - O2 weaned off.  - Will c/w IV abx for PNA with florastor.  - Blood cx CoNS likely contaminated- second set in process.    Consult for capacity obtained from Dr. Barnard- rec appreciated  Pt's son is the proxy  Atarax 10mg q8h PRN for anxiety    Underlying Uterine Cancer- No Curative treatment planned  Will speak to son about getting a Hospice Evaluation for additional services when patient returns to [facility]  Avoid readmissions        Hypoglycemia / Hypothermia:  - Resolved. Hypoglycemia from poor PO intake and also pt on antidiabetic meds.  - TSH normal, A1C 7.2 tight control given her age.  - LSS with FS monitoring.    Acute hyponatremia:  - Monitor    Chronic condition HTN /HLD /DM / neuropathy / arhtirits / Glaucoma /anxiety / Pressure ulcer:  - Resume home meds except oral hypoglycemic / antihypertensive drugs for now.  - Keep on LSS with Fs monitoring for DM. Currently hypoglycemic- will keep on D5 for now.  - Nursing care for sacral ulcers.    Code status:  - DNR /DNI- MOLST form from NH in chart.

## 2018-01-25 ENCOUNTER — TRANSCRIPTION ENCOUNTER (OUTPATIENT)
Age: 83
End: 2018-01-25

## 2018-01-25 VITALS — DIASTOLIC BLOOD PRESSURE: 57 MMHG | HEART RATE: 101 BPM | TEMPERATURE: 97 F | SYSTOLIC BLOOD PRESSURE: 112 MMHG

## 2018-01-25 LAB
CULTURE RESULTS: SIGNIFICANT CHANGE UP
GLUCOSE BLDC GLUCOMTR-MCNC: 214 MG/DL — HIGH (ref 70–99)
GLUCOSE BLDC GLUCOMTR-MCNC: 228 MG/DL — HIGH (ref 70–99)
ORGANISM # SPEC MICROSCOPIC CNT: SIGNIFICANT CHANGE UP
ORGANISM # SPEC MICROSCOPIC CNT: SIGNIFICANT CHANGE UP
SPECIMEN SOURCE: SIGNIFICANT CHANGE UP

## 2018-01-25 PROCEDURE — 99285 EMERGENCY DEPT VISIT HI MDM: CPT | Mod: 25

## 2018-01-25 PROCEDURE — 84443 ASSAY THYROID STIM HORMONE: CPT

## 2018-01-25 PROCEDURE — 87150 DNA/RNA AMPLIFIED PROBE: CPT

## 2018-01-25 PROCEDURE — 85027 COMPLETE CBC AUTOMATED: CPT

## 2018-01-25 PROCEDURE — 87798 DETECT AGENT NOS DNA AMP: CPT

## 2018-01-25 PROCEDURE — 83880 ASSAY OF NATRIURETIC PEPTIDE: CPT

## 2018-01-25 PROCEDURE — 99232 SBSQ HOSP IP/OBS MODERATE 35: CPT

## 2018-01-25 PROCEDURE — 87186 SC STD MICRODIL/AGAR DIL: CPT

## 2018-01-25 PROCEDURE — 85730 THROMBOPLASTIN TIME PARTIAL: CPT

## 2018-01-25 PROCEDURE — 87581 M.PNEUMON DNA AMP PROBE: CPT

## 2018-01-25 PROCEDURE — 99239 HOSP IP/OBS DSCHRG MGMT >30: CPT

## 2018-01-25 PROCEDURE — 80053 COMPREHEN METABOLIC PANEL: CPT

## 2018-01-25 PROCEDURE — 93005 ELECTROCARDIOGRAM TRACING: CPT

## 2018-01-25 PROCEDURE — 87486 CHLMYD PNEUM DNA AMP PROBE: CPT

## 2018-01-25 PROCEDURE — 87633 RESP VIRUS 12-25 TARGETS: CPT

## 2018-01-25 PROCEDURE — 83605 ASSAY OF LACTIC ACID: CPT

## 2018-01-25 PROCEDURE — 81001 URINALYSIS AUTO W/SCOPE: CPT

## 2018-01-25 PROCEDURE — 83525 ASSAY OF INSULIN: CPT

## 2018-01-25 PROCEDURE — 83036 HEMOGLOBIN GLYCOSYLATED A1C: CPT

## 2018-01-25 PROCEDURE — 85610 PROTHROMBIN TIME: CPT

## 2018-01-25 PROCEDURE — 82962 GLUCOSE BLOOD TEST: CPT

## 2018-01-25 PROCEDURE — 84100 ASSAY OF PHOSPHORUS: CPT

## 2018-01-25 PROCEDURE — 87086 URINE CULTURE/COLONY COUNT: CPT

## 2018-01-25 PROCEDURE — 83527 ASSAY OF INSULIN: CPT

## 2018-01-25 PROCEDURE — 84484 ASSAY OF TROPONIN QUANT: CPT

## 2018-01-25 PROCEDURE — 96374 THER/PROPH/DIAG INJ IV PUSH: CPT

## 2018-01-25 PROCEDURE — 36415 COLL VENOUS BLD VENIPUNCTURE: CPT

## 2018-01-25 PROCEDURE — 87040 BLOOD CULTURE FOR BACTERIA: CPT

## 2018-01-25 PROCEDURE — 96375 TX/PRO/DX INJ NEW DRUG ADDON: CPT

## 2018-01-25 PROCEDURE — 84206 ASSAY OF PROINSULIN: CPT

## 2018-01-25 PROCEDURE — 84681 ASSAY OF C-PEPTIDE: CPT

## 2018-01-25 PROCEDURE — 71275 CT ANGIOGRAPHY CHEST: CPT

## 2018-01-25 PROCEDURE — 93306 TTE W/DOPPLER COMPLETE: CPT

## 2018-01-25 PROCEDURE — 80048 BASIC METABOLIC PNL TOTAL CA: CPT

## 2018-01-25 PROCEDURE — 83690 ASSAY OF LIPASE: CPT

## 2018-01-25 PROCEDURE — 71045 X-RAY EXAM CHEST 1 VIEW: CPT

## 2018-01-25 PROCEDURE — 83735 ASSAY OF MAGNESIUM: CPT

## 2018-01-25 RX ORDER — ALPRAZOLAM 0.25 MG
1 TABLET ORAL
Qty: 0 | Refills: 0 | COMMUNITY

## 2018-01-25 RX ORDER — METHYLNALTREXONE BROMIDE 12 MG/.6ML
8 INJECTION, SOLUTION SUBCUTANEOUS ONCE
Qty: 0 | Refills: 0 | Status: COMPLETED | OUTPATIENT
Start: 2018-01-25 | End: 2018-01-25

## 2018-01-25 RX ORDER — INSULIN GLARGINE 100 [IU]/ML
13 INJECTION, SOLUTION SUBCUTANEOUS
Qty: 0 | Refills: 0 | COMMUNITY

## 2018-01-25 RX ORDER — DOCUSATE SODIUM 100 MG
1 CAPSULE ORAL
Qty: 0 | Refills: 0 | COMMUNITY

## 2018-01-25 RX ORDER — INSULIN GLARGINE 100 [IU]/ML
5 INJECTION, SOLUTION SUBCUTANEOUS
Qty: 0 | Refills: 0 | COMMUNITY
Start: 2018-01-25

## 2018-01-25 RX ORDER — ALPRAZOLAM 0.25 MG
0.25 TABLET ORAL DAILY
Qty: 0 | Refills: 0 | Status: DISCONTINUED | OUTPATIENT
Start: 2018-01-25 | End: 2018-01-25

## 2018-01-25 RX ORDER — MAGNESIUM HYDROXIDE 400 MG/1
15 TABLET, CHEWABLE ORAL
Qty: 0 | Refills: 0 | COMMUNITY

## 2018-01-25 RX ORDER — LISINOPRIL 2.5 MG/1
1 TABLET ORAL
Qty: 0 | Refills: 0 | COMMUNITY

## 2018-01-25 RX ORDER — TIMOLOL 0.5 %
1 DROPS OPHTHALMIC (EYE)
Qty: 0 | Refills: 0 | COMMUNITY

## 2018-01-25 RX ORDER — ASCORBIC ACID 60 MG
1 TABLET,CHEWABLE ORAL
Qty: 0 | Refills: 0 | COMMUNITY

## 2018-01-25 RX ORDER — SITAGLIPTIN 50 MG/1
1 TABLET, FILM COATED ORAL
Qty: 0 | Refills: 0 | COMMUNITY

## 2018-01-25 RX ORDER — MUPIROCIN 20 MG/G
1 OINTMENT TOPICAL
Qty: 0 | Refills: 0 | COMMUNITY

## 2018-01-25 RX ORDER — METFORMIN HYDROCHLORIDE 850 MG/1
1 TABLET ORAL
Qty: 0 | Refills: 0 | COMMUNITY

## 2018-01-25 RX ORDER — BRIMONIDINE TARTRATE 2 MG/MG
1 SOLUTION/ DROPS OPHTHALMIC
Qty: 0 | Refills: 0 | COMMUNITY

## 2018-01-25 RX ORDER — SIMVASTATIN 20 MG/1
1 TABLET, FILM COATED ORAL
Qty: 0 | Refills: 0 | COMMUNITY

## 2018-01-25 RX ORDER — METRONIDAZOLE 7.5 MG/G
0 GEL VAGINAL
Qty: 0 | Refills: 0 | COMMUNITY

## 2018-01-25 RX ORDER — INSULIN LISPRO 100/ML
0 VIAL (ML) SUBCUTANEOUS
Qty: 0 | Refills: 0 | COMMUNITY

## 2018-01-25 RX ORDER — MINERAL OIL
133 OIL (ML) MISCELLANEOUS ONCE
Qty: 0 | Refills: 0 | Status: COMPLETED | OUTPATIENT
Start: 2018-01-25 | End: 2018-01-25

## 2018-01-25 RX ADMIN — Medication 10 MILLIGRAM(S): at 12:19

## 2018-01-25 RX ADMIN — Medication 4: at 12:19

## 2018-01-25 RX ADMIN — Medication 250 MILLIGRAM(S): at 05:43

## 2018-01-25 RX ADMIN — METRONIDAZOLE 1 APPLICATION(S): 7.5 GEL VAGINAL at 05:43

## 2018-01-25 RX ADMIN — OXYCODONE AND ACETAMINOPHEN 1 TABLET(S): 5; 325 TABLET ORAL at 10:22

## 2018-01-25 RX ADMIN — OXYCODONE AND ACETAMINOPHEN 1 TABLET(S): 5; 325 TABLET ORAL at 14:59

## 2018-01-25 RX ADMIN — Medication 4: at 07:47

## 2018-01-25 RX ADMIN — BRIMONIDINE TARTRATE 1 DROP(S): 2 SOLUTION/ DROPS OPHTHALMIC at 05:42

## 2018-01-25 RX ADMIN — OXYCODONE AND ACETAMINOPHEN 1 TABLET(S): 5; 325 TABLET ORAL at 11:30

## 2018-01-25 RX ADMIN — Medication 100 MILLIGRAM(S): at 12:19

## 2018-01-25 RX ADMIN — CEFEPIME 100 MILLIGRAM(S): 1 INJECTION, POWDER, FOR SOLUTION INTRAMUSCULAR; INTRAVENOUS at 15:21

## 2018-01-25 RX ADMIN — MUPIROCIN 1 APPLICATION(S): 20 OINTMENT TOPICAL at 05:43

## 2018-01-25 RX ADMIN — Medication 10 MILLIGRAM(S): at 05:43

## 2018-01-25 RX ADMIN — OXYCODONE AND ACETAMINOPHEN 1 TABLET(S): 5; 325 TABLET ORAL at 05:53

## 2018-01-25 RX ADMIN — OXYCODONE AND ACETAMINOPHEN 1 TABLET(S): 5; 325 TABLET ORAL at 13:42

## 2018-01-25 RX ADMIN — Medication 1 DROP(S): at 05:42

## 2018-01-25 RX ADMIN — OXYCODONE AND ACETAMINOPHEN 1 TABLET(S): 5; 325 TABLET ORAL at 06:41

## 2018-01-25 NOTE — DISCHARGE NOTE ADULT - PLAN OF CARE
improvement of breathing- treated for PNA completed course of antibiotics monitor level and trending up- currently 130 repeat labs in one week pt presented in hypoglycemia- currently corrected carbohydrate consistent diet, HgA1C 7.2 due to pt's age and presenting in hypoglycemia- d/c januvia, decrease Lantus to 5 units and continue YARON- further management by SNF MD maintain healthy blood pressure continue pre-admission medications. maintain healthy cholesterol level continue pre-admission medications Take antibiotics for 4 more days to complete the course. Stable 130 carbohydrate consistent diet, HgA1C 7.2 due to pt's age and presenting in hypoglycemia- d/c januvia, decrease Lantus to 5 units and continue YARON and metformin- further medication adjustment by SNF MD

## 2018-01-25 NOTE — DISCHARGE NOTE ADULT - MEDICATION SUMMARY - MEDICATIONS TO CHANGE
I will SWITCH the dose or number of times a day I take the medications listed below when I get home from the hospital:    Lantus 100 units/mL subcutaneous solution  -- 13 unit(s) subcutaneous once a day (at bedtime)

## 2018-01-25 NOTE — PROGRESS NOTE ADULT - SUBJECTIVE AND OBJECTIVE BOX
INTERVAL HPI/OVERNIGHT EVENTS: 84oyFemaleoPatient with metastatic uterine cancer admitted from [facility]  in Acute Respiratroy Distress.   Found to have PNA, is on IV ABX and will be discharged back sometime today. She is anxious at times not eating well worried about being nauseous, has  receptacle in her hand at all times. She is experiencing breakthrough abdominal pain Percocet ordered Q8h ATC, frequiency needs to be increased  to avoid severe BTP episodes. She is alert and oriented and anxiety level significant.  Still C/O constipation.  Denies vomiting diarrhea CP, Palpitations dizziness has not been OOB.    Pt is very poor historian and unable to provide meaningful history. History obtained from ED staff, EMR and NH charts. Briefly she is 83 y/o female with PMHx of HTN, HLD, DM, Neuropathy, Arthritis,  Anxiety disorder, pressure ulcers, glaucoma, was sent to ED from NH for respiratory distress. Per history pt was noted to have tachypnea, laboured breathing at NH and noted to have hypoglycemia 51 by EMS and glucagon was given en-route to ED. Upon arrival to ED she was very tachypnea and patient was placed on CPAP. During my evaluation patient on NC oxygen, NAD, reports she is fine and at baseline, she denied any breathing problems but at the same time is AOX2- poor historian. (20 Jan 2018 10:22)           84y old  Female who presents with a chief complaint of Sent from NH for respiratory distress. (20 Jan 2018 10:22)      Present Symptoms:     Dyspnea:  1 mild  Nausea/ Yes No  Anxiety:  Yes   Depression: No  Fatigue: Yes   Loss of appetite: Yes     Pain: abdominal and chronic back pain            Character-            Duration-            Effect-            Factors-            Frequency-            Location-            Severity-moderate to severe    Review of Systems: Reviewed                     All others negative    MEDICATIONS  (STANDING):  ALPRAZolam 0.25 milliGRAM(s) Oral at bedtime  brimonidine 0.2% Ophthalmic Solution 1 Drop(s) Both EYES two times a day  cefepime  IVPB 2000 milliGRAM(s) IV Intermittent <User Schedule>  dextrose 5%. 1000 milliLiter(s) (50 mL/Hr) IV Continuous <Continuous>  dextrose 50% Injectable 12.5 Gram(s) IV Push once  dextrose 50% Injectable 25 Gram(s) IV Push once  dextrose 50% Injectable 25 Gram(s) IV Push once  docusate sodium 100 milliGRAM(s) Oral daily  insulin lispro (HumaLOG) corrective regimen sliding scale   SubCutaneous three times a day before meals  metoclopramide 10 milliGRAM(s) Oral three times a day  metroNIDAZOLE 0.75% Gel 1 Application(s) Topical two times a day  mineral oil enema 133 milliLiter(s) Rectal once  multivitamin 1 Tablet(s) Oral daily  mupirocin 2% Ointment 1 Application(s) Topical two times a day  oxyCODONE    5 mG/acetaminophen 325 mG 1 Tablet(s) Oral every 8 hours  polyethylene glycol 3350 17 Gram(s) Oral at bedtime  saccharomyces boulardii 250 milliGRAM(s) Oral two times a day  senna 2 Tablet(s) Oral at bedtime  simvastatin 20 milliGRAM(s) Oral at bedtime  timolol 0.5% Solution 1 Drop(s) Both EYES two times a day    MEDICATIONS  (PRN):  ALPRAZolam 0.25 milliGRAM(s) Oral daily PRN anxiety, panic attack  dextrose Gel 1 Dose(s) Oral once PRN Blood Glucose LESS THAN 70 milliGRAM(s)/deciliter  glucagon  Injectable 1 milliGRAM(s) IntraMuscular once PRN Glucose LESS THAN 70 milligrams/deciliter  hydrOXYzine hydrochloride 10 milliGRAM(s) Oral every 8 hours PRN Anxiety  oxyCODONE    5 mG/acetaminophen 325 mG 1 Tablet(s) Oral every 4 hours PRN breeakthrough pain  sodium biphosphate Rectal Enema 1 Enema Rectal once PRN constipation      PHYSICAL EXAM:    Vital Signs Last 24 Hrs  T(C): 37.2 (25 Jan 2018 08:53), Max: 37.2 (25 Jan 2018 08:53)  T(F): 98.9 (25 Jan 2018 08:53), Max: 98.9 (25 Jan 2018 08:53)  HR: 86 (25 Jan 2018 08:53) (81 - 93)  BP: 132/66 (25 Jan 2018 08:53) (130/67 - 141/65)  BP(mean): --  RR: 18 (25 Jan 2018 08:53) (18 - 18)  SpO2: 94% (25 Jan 2018 08:53) (94% - 95%)    General: alert  oriented x 3____                        HEENT  dry mouth     Lungs: comfortable     CV: normal      GI:  distended  tender                PEG/NG/OG tube  constipation  last BM:     :  incontinent      MSK: weakness              bedbound/wheelchair bound    Skin: normal    no rash    LABS:                        9.7    9.9   )-----------( 591      ( 23 Jan 2018 15:47 )             29.4     01-24    130<L>  |  96<L>  |  15.0  ----------------------------<  181<H>  4.5   |  25.0  |  0.83    Ca    9.7      24 Jan 2018 16:37          I&O's Summary      RADIOLOGY & ADDITIONAL STUDIES:    ADVANCE DIRECTIVES:   DNR YES   Completed on:                     MOLST  YES    Completed on:  Living Will   NO   Completed on:

## 2018-01-25 NOTE — PROGRESS NOTE ADULT - ASSESSMENT
Acute hypoxic Respiratory distress likely from PNA with mucus plug:  - CT chest shows b/l effusion, mucous plugging and possible superimposed PNA. Normal Ef on echo.   - Cefepime 2000mg IVPB  - O2 weaned off.  - Will c/w IV abx for PNA with florastor.  - Blood cx CoNS likely contaminated- second set in process.    Consult for capacity obtained from Dr. Barnard- rec appreciated  Pt's son is the proxy  Atarax 10mg q8h PRN for anxiety    Underlying Uterine Cancer- No Curative treatment planned  Will speak to son about getting a Hospice Evaluation for additional services when patient returns to [facility]  Avoid readmissions        Hypoglycemia / Hypothermia:  - Resolved. Hypoglycemia from poor PO intake and also pt on antidiabetic meds.  - TSH normal, A1C 7.2 tight control given her age.  - LSS with FS monitoring.    Chronic condition HTN /HLD /DM / neuropathy / arhtirits / Glaucoma /anxiety / Pressure ulcer:  - Resume home meds except oral hypoglycemic / antihypertensive drugs for now.  - Keep on LSS with Fs monitoring for DM. Currently hypoglycemic- will keep on D5 for now.  - Nursing care for sacral ulcers.    Code status:  - DNR /DNI- MOLST form from NH in chart.         Problem/Recommendation - 1:  Problem: Pneumonia due to infectious organism, unspecified laterality, unspecified part of lung .IV ABX--F/U in SNF     Problem/Recommendation - 2:  ·  Problem: Respiratory distress. resolved     Problem/Recommendation - 3:  ·  Problem: Chronic back pain, unspecified back location, unspecified back pain laterality. bREAKTHROUGH PAIN EPISODES DEMAND >FREQUENCY OF PERCOCET     Problem/Recommendation - 4:  ·  Problem: Malignant neoplasm of overlapping sites of body of uterus.      Problem/Recommendation - 6:  Problem: Constipation by delayed colonic transit.  May be opioid induced-- Recommendation: Bowel regime initiated.  Relistor SC OPIOID INDUCED CONSTIPATION    Attending Attestation:   COUNSELING:    Face to face meeting to discuss Advanced Care Planning - Time Spent ______ Minutes.  See goals of care note.    More than 50% time spent in counseling and coordinating care. 30______ Minutes.

## 2018-01-25 NOTE — DISCHARGE NOTE ADULT - CARE PLAN
Principal Discharge DX:	Respiratory distress  Goal:	improvement of breathing- treated for PNA  Assessment and plan of treatment:	completed course of antibiotics  Secondary Diagnosis:	Hyponatremia  Goal:	monitor level and trending up- currently 130  Assessment and plan of treatment:	repeat labs in one week  Secondary Diagnosis:	Diabetes 1.5, managed as type 2  Goal:	pt presented in hypoglycemia- currently corrected  Assessment and plan of treatment:	carbohydrate consistent diet, HgA1C 7.2 due to pt's age and presenting in hypoglycemia- d/c januvia, decrease Lantus to 5 units and continue YARON- further management by SNF MD  Secondary Diagnosis:	Essential hypertension  Goal:	maintain healthy blood pressure  Assessment and plan of treatment:	continue pre-admission medications.  Secondary Diagnosis:	Pure hypercholesterolemia  Goal:	maintain healthy cholesterol level  Assessment and plan of treatment:	continue pre-admission medications Principal Discharge DX:	Respiratory distress  Goal:	improvement of breathing- treated for PNA  Assessment and plan of treatment:	Take antibiotics for 4 more days to complete the course.  Secondary Diagnosis:	Hyponatremia  Goal:	Stable 130  Assessment and plan of treatment:	repeat labs in one week  Secondary Diagnosis:	Diabetes 1.5, managed as type 2  Goal:	pt presented in hypoglycemia- currently corrected  Assessment and plan of treatment:	carbohydrate consistent diet, HgA1C 7.2 due to pt's age and presenting in hypoglycemia- d/c januvia, decrease Lantus to 5 units and continue YARON and metformin- further medication adjustment by SNF MD  Secondary Diagnosis:	Essential hypertension  Goal:	maintain healthy blood pressure  Assessment and plan of treatment:	continue pre-admission medications.  Secondary Diagnosis:	Pure hypercholesterolemia  Goal:	maintain healthy cholesterol level  Assessment and plan of treatment:	continue pre-admission medications

## 2018-01-25 NOTE — DISCHARGE NOTE ADULT - MEDICATION SUMMARY - MEDICATIONS TO STOP TAKING
I will STOP taking the medications listed below when I get home from the hospital:    Januvia 100 mg oral tablet  -- 1 tab(s) by mouth once a day

## 2018-01-25 NOTE — PROGRESS NOTE ADULT - ATTENDING COMMENTS
COUNSELING:    Face to face meeting to discuss Advanced Care Planning - Time Spent _10____ Minutes.  See goals of care note.    More than 50% time spent in counseling and coordinating care. ___30___ Minutes.     Thank you for the opportunity to assist with the care of this patient.      Spoke to son Yaw who agrees with plan to request Hospice Services for his Mom back at [facility] [phone].
Pt medically stable for discharge to go back to facility pending insurance authorization.
Addendum:     I called patient's son multiple times but unreachable, Dr Barnard spoke to her son who is reported HCP and he mentioned that patient has known uterine malignancy with possible hepatic mets and they do not want any further intervention on that.
I have seen and examined the patient with PA and agree with the above assessment and plan. Pt clinically stable, on IV abx- pending repeat cx. D/c pending repeat cx result.

## 2018-01-25 NOTE — DISCHARGE NOTE ADULT - MEDICATION SUMMARY - MEDICATIONS TO TAKE
I will START or STAY ON the medications listed below when I get home from the hospital:    Percocet 10/325  -- orally 3 times a day, As Needed  -- Indication: For Pain    lisinopril 20 mg oral tablet  -- 1 tab(s) by mouth once a day  -- Indication: For Essential hypertension    Milk of Magnesia 8% oral suspension  -- 15 milliliter(s) by mouth once a day (at bedtime), As Needed  -- Indication: For Constipation by delayed colonic transit    metFORMIN 500 mg oral tablet  -- 1 tab(s) by mouth once a day  -- Indication: For Diabetes 1.5, managed as type 2    HumaLOG KwikPen 100 units/mL injectable solution  -- Sliding scale  -- Indication: For Diabetes 1.5, managed as type 2    Lantus 100 units/mL subcutaneous solution  -- 5 unit(s) subcutaneous once a day (at bedtime)  -- Indication: For Diabetes 1.5, managed as type 2    Zocor 20 mg oral tablet  -- 1 tab(s) by mouth once a day (at bedtime)  -- Indication: For hypercholesterolemia    Xanax 0.25 mg oral tablet  -- 1 tab(s) by mouth once a day (at bedtime)  -- Indication: For Anxiety disorder, unspecified type    Bactroban 2% topical ointment  -- Apply on skin to affected area 2 times a day  -- Indication: For Skin rash    metroNIDAZOLE topical  -- Indication: For Skin rash    hydroCHLOROthiazide 12.5 mg oral capsule  -- 1 cap(s) by mouth once a day  -- Indication: For Essential hypertension    Colace  -- 1 tab(s) by mouth once a day  -- Indication: For Constipation by delayed colonic transit    Fleet Enema 7 g-19 g rectal enema  -- 133 milliliter(s) rectally once, As Needed  -- Indication: For Constipation by delayed colonic transit    Timoptic Ocudose 0.5% ophthalmic solution  -- 1 drop(s) to each affected eye 2 times a day  -- Indication: For Glaucoma, unspecified glaucoma type, unspecified laterality    brimonidine 0.2% ophthalmic solution  -- 1 drop(s) to each affected eye 2 times a day  -- Indication: For Glaucoma, unspecified glaucoma type, unspecified laterality    Levaquin 500 mg oral tablet  -- 1 tab(s) by mouth every 24 hours  -- Indication: For Pneumonia due to infectious organism, unspecified laterality, unspecified part of lung    Multiple Vitamins oral tablet  -- 1 tab(s) by mouth once a day  -- Indication: For health maintainence    Vitamin C 500 mg oral tablet  -- 1 tab(s) by mouth once a day  -- Indication: For health maintainence

## 2018-01-25 NOTE — DISCHARGE NOTE ADULT - HOSPITAL COURSE
Pt is very poor historian and unable to provide meaningful history. History obtained from ED staff, EMR and NH charts. Briefly she is 83 y/o female with PMHx of HTN, HLD, DM, Neuropathy, Arthritis,  Anxiety disorder, pressure ulcers, glaucoma, was sent to ED from NH for respiratory distress. Per history pt was noted to have tachypnea, laboured breathing at NH and noted to have hypoglycemia 51 by EMS and glucagon was given en-route to ED. Upon arrival to ED she was very tachypnea and patient was placed on CPAP. During my evaluation patient on NC oxygen, NAD, reports she is fine and at baseline, she denied any breathing problems but at the same time is AOX2- poor historian.        - CXR unclear for PNA- CT chest shows b/l effusion, mucous plugging and possible superimposed PNA. Normal Ef on echo. pt completed course of IV antbx and given supplemental O2 as needed and clinically improved.  Blood cx CoNS likely contaminated- repeat cx showed no growth.        CXR 1/20/18  Support Devices: None  Heart:  Unremarkable  Mediastinum:  Unremarkable  Lungs/Airways:  Unremarkable  Bones/Soft tissues: Unremarkable    Chest CT:  1/21/1/   Evaluation of the pulmonary arteries degraded by respiratory   motion. No central pulmonary embolism.    Left lower lobe endobronchial mucus impaction with underlying airspace   disease, may be combination of compressive atelectasis and pneumonia.   Small pleural effusions. Cardiomegaly.    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      TTE  1/20  Summary:   1. Left ventricular ejection fraction, by visual estimation, is 55 to   60%.   2. Normal global left ventricular systolic function.   3. Apical septal segment is abnormal as described above.   4. Spectral Doppler shows impaired relaxation pattern of left   ventricular myocardial filling (Grade I diastolic dysfunction).   5. Mild mitral annular calcification.   6. Thickening and calcification of the anterior and posterior mitral   valve leaflets.   7. Mild-moderate tricuspid regurgitation.   8. Sclerotic aortic valve with normal opening.   9. There is mild aortic root calcification. Pt is very poor historian and unable to provide meaningful history. History obtained from ED staff, EMR and NH charts. Briefly she is 85 y/o female with PMHx of HTN, HLD, DM, Neuropathy, Arthritis,  Anxiety disorder, pressure sacral ulcers, glaucoma, was sent to ED from NH for respiratory distress. Per history pt was noted to have tachypnea, laboured breathing at NH and noted to have hypoglycemia 51 by EMS and glucagon was given en-route to ED. Upon arrival to ED she was very tachypnea and patient was placed on CPAP. During my evaluation patient on NC oxygen, NAD. IN ED noted to have leukocytosis, hypoglycemia, hypothermia kept on warming blanket, high Pro-BNP, UA /CXR unremarkable. Admitted to medicine for respiratory distress and hypoglycemia. Started on empiric abx for suspected PNA. CT chest shows b/l effusion, mucous plugging and possible superimposed PNA- Started on IV abx- urine cx came out positive and sensitive to IV abx Completed 5 days). Blood cx shows CoNS likely contaminated- repeat cx negative. Pt improved clinically, oxygen tapered off and saturating 98 on RA for > 48 hours. Hypothermia /hypoglycemia resolved. A1C 7.2- tight control given her age- advised cut down lantus to 5 units, c/w LSS / metformin and hold Januvia- Further adjustment per SNF MD.    Off note, CT chest shows suspected abdomen malignancy and CT abdomen /pelvis ordered to assess the lesion but patient / her son refused for CT abdomen, patient her son reported that patien thas known uterine malignancy and do not want further testing for it. Pt stable for discharge back to NH.    PHYSICAL EXAM:    Vital Signs Last 24 Hrs  T(C): 37.2 (25 Jan 2018 08:53), Max: 37.2 (25 Jan 2018 08:53)  T(F): 98.9 (25 Jan 2018 08:53), Max: 98.9 (25 Jan 2018 08:53)  HR: 86 (25 Jan 2018 08:53) (81 - 93)  BP: 132/66 (25 Jan 2018 08:53) (130/67 - 141/65)  BP(mean): --  RR: 18 (25 Jan 2018 08:53) (18 - 18)  SpO2: 94% (25 Jan 2018 08:53) (94% - 95%)    GENERAL: Pt lying comfortably, NAD.  CHEST/LUNG: Clear to auscultation bilaterally; No wheezing.  HEART: S1S2+, Regular rate and rhythm; No murmurs.  ABDOMEN: Soft, Nontender, Nondistended; Bowel sounds present.  NEURO: AAOX2, no focal deficits, no motor r sensory loss.  Skin: Sacral ulcer II stage / right buttock redness    Total time spent on discharge 35 minutes.

## 2018-01-25 NOTE — DISCHARGE NOTE ADULT - PATIENT PORTAL LINK FT
“You can access the [facility] Patient Portal, offered by [facility], by registering with the following website: [email or web address]”

## 2018-01-25 NOTE — DISCHARGE NOTE ADULT - VISION (WITH CORRECTIVE LENSES IF THE PATIENT USUALLY WEARS THEM):
does wear glasses/Normal vision: sees adequately in most situations; can see medication labels, newsprint Partially impaired: cannot see medication labels or newsprint, but can see obstacles in path, and the surrounding layout; can count fingers at arm's length/does wear glasses

## 2018-01-26 LAB
CULTURE RESULTS: SIGNIFICANT CHANGE UP
CULTURE RESULTS: SIGNIFICANT CHANGE UP
SPECIMEN SOURCE: SIGNIFICANT CHANGE UP
SPECIMEN SOURCE: SIGNIFICANT CHANGE UP

## 2018-01-26 RX ORDER — CIPROFLOXACIN LACTATE 400MG/40ML
1 VIAL (ML) INTRAVENOUS
Qty: 0 | Refills: 0 | COMMUNITY
Start: 2018-01-26 | End: 2018-01-29

## 2019-01-09 NOTE — PATIENT PROFILE ADULT. - NS PRO ABUSE SCREEN SUSPICION NEGLECT YN
01/09/19 1441   Group 4   Start Time 1500   Stop Time 1545   Length (min) 45 min   Group Name Therapeutic activity   Focus of Group Qi Gong   Attendance Present   Mood Bright   Affect Calm   Behavior/Socialization Cooperative;Engaged   Thought Process Tracking   Patient Response Attentive;Interactive   Task Performance Follows directions   Group Notes Pt was attentive to the presentation and actively participated in the movements. Pt shared that she really enjoys this group and wishes it was everyday.   Nisa Couch, LPC     unable to assess

## 2020-12-07 NOTE — DISCHARGE NOTE ADULT - DISCHARGE DATE
Continue Metoprolol  Outpatient follow up as previously directed  
Continue metoprolol  Outpatient PCP follow up  
Weight loss and lifestyle modifications management as outpatient  
25-Jan-2018

## 2020-12-17 NOTE — PROVIDER CONTACT NOTE (CRITICAL VALUE NOTIFICATION) - TEST AND RESULT REPORTED:
Outreach to carrier for claim number - unable to retrieve without provider tax ID #.    Outreach to employer - message left to [employer] to writer at [phone].      
Positive blood cultures 1 anaerobic and 2 aerobic

## 2023-01-31 NOTE — BEHAVIORAL HEALTH ASSESSMENT NOTE - NSBHCONSULTPRIMARYDISCUSSYES_PSY_A_CORE FT
Grossly Intact
Spoke with team about treating her infection and sending her back to NH as per her and son's wishes

## 2024-12-18 NOTE — ED PROVIDER NOTE - NS ED MD TWO NIGHTS YN
You were evaluated in the emergency department for: chest pain. You can access your current and pending results through [facility]'s "[employer]. A radiologist will take a second look at your X-Rays, if you had any, and you will be contacted with any new findings.     You should follow-up with your primary care provider, as soon as possible, for re-evaluation.  If you do not have a primary care provider, I have referred you to [facility]. You will be contacted about scheduling an appointment. Their phone number is also included on this paperwork.     Your workup revealed no emergent features at this time; however, many disease processes are dynamic:    Please, return to the emergency department if you experience new or worsening symptoms, fever, chest pain, shortness of breath, difficulty breathing, dizziness, abdominal pain, persistent nausea/vomiting, syncope or passing out, blood in your urine or stool, coughing up blood, leg swelling/pain, urinary retention, bowel or bladder incontinence, numbness between your legs.      
Yes

## 2024-12-23 NOTE — PROGRESS NOTE ADULT - RS GEN PE MLT RESP DETAILS PC
Multiple Vitamin (Tab-A-Connie/Beta Carotene) Tab -- -- 7/8/2024 --    Sig: GIVE 1 TABLET BY MOUTH DAILY      Last refill: 9/9/24   Last med check: 7/1/24  Pharmacy: [facility] on [address]    diminished breath sounds, L/diminished breath sounds, R/rhonchi